# Patient Record
Sex: FEMALE | Race: WHITE | Employment: OTHER | URBAN - METROPOLITAN AREA
[De-identification: names, ages, dates, MRNs, and addresses within clinical notes are randomized per-mention and may not be internally consistent; named-entity substitution may affect disease eponyms.]

---

## 2021-07-08 ENCOUNTER — APPOINTMENT (OUTPATIENT)
Dept: CT IMAGING | Age: 86
End: 2021-07-08
Payer: MEDICARE

## 2021-07-08 ENCOUNTER — HOSPITAL ENCOUNTER (EMERGENCY)
Age: 86
Discharge: HOME | End: 2021-07-09
Payer: MEDICARE

## 2021-07-08 DIAGNOSIS — S01.01XA LACERATION OF SCALP, INITIAL ENCOUNTER: ICD-10-CM

## 2021-07-08 DIAGNOSIS — S09.90XA CLOSED HEAD INJURY, INITIAL ENCOUNTER: Primary | ICD-10-CM

## 2021-07-08 DIAGNOSIS — S13.9XXA NECK SPRAIN, INITIAL ENCOUNTER: ICD-10-CM

## 2021-07-08 PROCEDURE — 70450 CT HEAD/BRAIN W/O DYE: CPT

## 2021-07-08 PROCEDURE — 72125 CT NECK SPINE W/O DYE: CPT

## 2021-07-08 PROCEDURE — 12001 RPR S/N/AX/GEN/TRNK 2.5CM/<: CPT

## 2021-07-08 PROCEDURE — 99284 EMERGENCY DEPT VISIT MOD MDM: CPT

## 2021-07-08 ASSESSMENT — PAIN SCALES - GENERAL: PAINLEVEL_OUTOF10: 1

## 2021-07-08 ASSESSMENT — PAIN DESCRIPTION - ONSET: ONSET: ON-GOING

## 2021-07-08 ASSESSMENT — PAIN DESCRIPTION - FREQUENCY: FREQUENCY: CONTINUOUS

## 2021-07-08 ASSESSMENT — PAIN DESCRIPTION - ORIENTATION: ORIENTATION: POSTERIOR

## 2021-07-08 ASSESSMENT — PAIN DESCRIPTION - PROGRESSION: CLINICAL_PROGRESSION: GRADUALLY WORSENING

## 2021-07-08 ASSESSMENT — PAIN DESCRIPTION - DESCRIPTORS: DESCRIPTORS: DISCOMFORT

## 2021-07-08 ASSESSMENT — PAIN DESCRIPTION - PAIN TYPE: TYPE: ACUTE PAIN

## 2021-07-08 ASSESSMENT — PAIN DESCRIPTION - LOCATION: LOCATION: HEAD

## 2021-07-09 VITALS
OXYGEN SATURATION: 97 % | SYSTOLIC BLOOD PRESSURE: 200 MMHG | WEIGHT: 125 LBS | HEART RATE: 75 BPM | BODY MASS INDEX: 20.83 KG/M2 | DIASTOLIC BLOOD PRESSURE: 94 MMHG | HEIGHT: 65 IN | TEMPERATURE: 97.5 F | RESPIRATION RATE: 14 BRPM

## 2021-07-09 RX ORDER — BACITRACIN ZINC AND POLYMYXIN B SULFATE 500; 1000 [USP'U]/G; [USP'U]/G
OINTMENT TOPICAL
Qty: 30 G | Refills: 0 | Status: SHIPPED | OUTPATIENT
Start: 2021-07-09 | End: 2021-07-16

## 2021-07-09 NOTE — ED NOTES
FIRST PROVIDER CONTACT ASSESSMENT NOTE        Department of Emergency Medicine            ED  First Provider Note            7/8/21  8:40 PM EDT    Chief Complaint: Fall (fell backwards, hitting head on step) and Head Injury (laceration back of head, no blood thinners)      History of Present Illness:    Maximilian Torres is a 80 y.o. female who presents to the emergency department for  Mechanical fall  Head injury scalp laceration   Focused Screening Exam:  Constitutional:  Alert, appears stated age and is in no distress. Heart rrr   Lungs clear    *ALLERGIES*     Patient has no known allergies.      ED Triage Vitals [07/08/21 2038]   BP Temp Temp Source Pulse Resp SpO2 Height Weight   (!) 225/101 97 °F (36.1 °C) Temporal 88 14 98 % 5' 5\" (1.651 m) 125 lb (56.7 kg)        Initial Plan of Care:  Initiate Treatment-Testing, Proceed toTreatment Area When Bed Available for ED Attending/MLP to Continue Care    -----------------END OF FIRST PROVIDER CONTACT ASSESSMENT NOTE--------------  Electronically signed by LANI Cuellar   DD: 7/8/21     LANI Cuellar  07/08/21 2041

## 2021-07-09 NOTE — ED PROVIDER NOTES
Independent MLP   HPI:  7/9/21, Time: 12:05 AM EDT         Maximilian Torres is a 80 y.o. female presenting to the ED for fall , beginning prior to arrival .  The complaint has been persistent, moderate in severity, and worsened by nothing. Patient comes in for mechanical fall. Her foot got caught on a step causing her to shoe to come off and she fell backwards hitting her head. She denied any loss of consciousness. She denies any neck pain. Denies any chest pain abdominal pain no hip pain or lower extremity pain. She denies being on any blood thinners. Patient denies any headache. No blurred vision loss of vision    Review of Systems:   A complete review of systems was performed and pertinent positives and negatives are stated within HPI, all other systems reviewed and are negative.          --------------------------------------------- PAST HISTORY ---------------------------------------------  Past Medical History:  has a past medical history of Hypertension. Past Surgical History:  has no past surgical history on file. Social History:  reports that she has never smoked. She has never used smokeless tobacco. She reports current alcohol use. She reports that she does not use drugs. Family History: family history is not on file. The patients home medications have been reviewed. Allergies: Patient has no known allergies. -------------------------------------------------- RESULTS -------------------------------------------------  All laboratory and radiology results have been personally reviewed by myself   LABS:  No results found for this visit on 07/08/21. RADIOLOGY:  Interpreted by Radiologist.  Lelia Oh   Final Result   No acute intracranial abnormality. Age-related loss of brain volume and   chronic periventricular ischemic changes. Small chronic basal ganglia   lacunar infarcts. Right posterior parietal scalp hematoma.       Air-fluid level in the left sphenoid sinus which may be secondary to acute   sinusitis or may reflect dependent secretions. CT CERVICAL SPINE WO CONTRAST   Final Result   No evidence of acute cervical spine fracture. Degenerative changes of the cervical spine as noted. ------------------------- NURSING NOTES AND VITALS REVIEWED ---------------------------   The nursing notes within the ED encounter and vital signs as below have been reviewed. BP (!) 200/94   Pulse 75   Temp 97.5 °F (36.4 °C) (Oral)   Resp 14   Ht 5' 5\" (1.651 m)   Wt 125 lb (56.7 kg)   SpO2 97%   BMI 20.80 kg/m²   Oxygen Saturation Interpretation: Normal      ---------------------------------------------------PHYSICAL EXAM--------------------------------------      Constitutional/General: Alert and oriented x3, well appearing, non toxic in NAD  Head: Normocephalic 2 cm laceration of the right posterior scalp  Eyes: PERRL, EOMI  Mouth: Oropharynx clear, handling secretions, no trismus  Neck: Supple, full ROM, no vertebral point tenderness  Pulmonary: Lungs clear to auscultation bilaterally, no wheezes, rales, or rhonchi. Not in respiratory distress  Cardiovascular:  Regular rate and rhythm, no murmurs, gallops, or rubs. 2+ distal pulses  Abdomen: Soft, non tender, non distended,   Back no vertebral point tenderness  Extremities: Moves all extremities x 4. Warm and well perfused no bony point tenderness of the hip or pelvis no tenderness of the upper or lower extremities no chest wall tenderness  Skin: warm and dry without rash  Neurologic: GCS 15,  Psych: Normal Affect      ------------------------------ ED COURSE/MEDICAL DECISION MAKING----------------------  Medications   Tetanus-Diphth-Acell Pertussis (BOOSTRIX) injection 0.5 mL (0.5 mLs Intramuscular Not Given 7/9/21 0039)         ED COURSE:   Patient states she has chronic sinus congestions had no recent change or fevers to the sinus congestion.     I reverified  Patient is not on eliquis .    Patient with noted elevated blood pressure she states she was due for her blood pressure meds and took medications here in the ER she did not want to wait for any further evaluation after taking the medication      PROCEDURE NOTE  7/9/21       Time:2340    LACERATION REPAIR  Risks, benefits and alternatives (for applicable procedures below) described. Performed By: LANI Pedraza. Informed consent: Verbal consent obtained. The patient was counseled regarding the procedure in person, it's indications, risks, potential complications and alternatives and any questions were answered. Verbal consent was obtained. Laceration #: 1. Location: right posterior scalp  Length: 2 cm. The wound area was cleansend with shur-clens and draped in a sterile fashion. Local Anesthesia:  obtained with Lidocaine 1% without epinephrine. The wound was explored with the following results: Thickness: partial thickness. No foreign bodies found. Debridement: None. Undermining: partial thickness. Wound Margins Revised: yes. Flaps Aligned: no. The wound was closed #4 staple(s). There were no additional wounds requiring formal closure. 4 staples   Medical Decision Making:    She came in for mechanical fall hitting the back of her head with laceration to the posterior scalp. CT head no intracranial bleed there is finding of air-fluid level in the left patient denies any acute symptoms states she has chronic sinus congestion. No fevers. Did not want treated for any sinusitis. CT cervical no fracture or dislocation. She had no pain of the hips or pelvis area she is able to ambulate. Patient was noted to be hypertensive she has history of hypertension took her blood pressure medication prior to being discharged.   Discussed with daughter monitoring patient closely due to the head injury for any change in mental status persistent vomiting she understands she is to return to the ER if any worsening symptoms    Counseling: The emergency provider has spoken with the patient and discussed todays results, in addition to providing specific details for the plan of care and counseling regarding the diagnosis and prognosis. Questions are answered at this time and they are agreeable with the plan.      --------------------------------- IMPRESSION AND DISPOSITION ---------------------------------    IMPRESSION  1. Closed head injury, initial encounter    2. Neck sprain, initial encounter    3. Laceration of scalp, initial encounter        DISPOSITION  Disposition: Discharge to home  Patient condition is good      NOTE: This report was transcribed using voice recognition software.  Every effort was made to ensure accuracy; however, inadvertent computerized transcription errors may be present     Ami Kilgore  07/09/21 0201

## 2022-09-21 ENCOUNTER — HOSPITAL ENCOUNTER (OUTPATIENT)
Age: 87
Discharge: HOME | End: 2022-09-23
Payer: MEDICARE

## 2022-09-21 ENCOUNTER — HOSPITAL ENCOUNTER (OUTPATIENT)
Dept: GENERAL RADIOLOGY | Age: 87
Discharge: HOME | End: 2022-09-23
Payer: MEDICARE

## 2022-09-21 DIAGNOSIS — M54.50 LOW BACK PAIN, UNSPECIFIED BACK PAIN LATERALITY, UNSPECIFIED CHRONICITY, UNSPECIFIED WHETHER SCIATICA PRESENT: ICD-10-CM

## 2022-09-21 DIAGNOSIS — M79.605 LEFT LEG PAIN: ICD-10-CM

## 2022-09-21 PROCEDURE — 72170 X-RAY EXAM OF PELVIS: CPT

## 2022-09-21 PROCEDURE — 72110 X-RAY EXAM L-2 SPINE 4/>VWS: CPT

## 2023-07-07 ENCOUNTER — APPOINTMENT (OUTPATIENT)
Dept: CT IMAGING | Age: 88
End: 2023-07-07
Payer: COMMERCIAL

## 2023-07-07 ENCOUNTER — APPOINTMENT (OUTPATIENT)
Dept: GENERAL RADIOLOGY | Age: 88
End: 2023-07-07
Payer: COMMERCIAL

## 2023-07-07 ENCOUNTER — HOSPITAL ENCOUNTER (EMERGENCY)
Age: 88
Discharge: ANOTHER HOSPITAL | End: 2023-07-07
Attending: EMERGENCY MEDICINE
Payer: COMMERCIAL

## 2023-07-07 ENCOUNTER — HOSPITAL ENCOUNTER (INPATIENT)
Age: 88
LOS: 3 days | Discharge: HOME | DRG: 065 | End: 2023-07-10
Attending: EMERGENCY MEDICINE | Admitting: INTERNAL MEDICINE
Payer: COMMERCIAL

## 2023-07-07 VITALS
HEART RATE: 101 BPM | HEIGHT: 66 IN | DIASTOLIC BLOOD PRESSURE: 63 MMHG | OXYGEN SATURATION: 95 % | BODY MASS INDEX: 20.89 KG/M2 | SYSTOLIC BLOOD PRESSURE: 123 MMHG | TEMPERATURE: 98.8 F | RESPIRATION RATE: 19 BRPM | WEIGHT: 130 LBS

## 2023-07-07 DIAGNOSIS — I48.91 ATRIAL FIBRILLATION, UNSPECIFIED TYPE (HCC): ICD-10-CM

## 2023-07-07 DIAGNOSIS — I63.312 CEREBROVASCULAR ACCIDENT (CVA) DUE TO THROMBOSIS OF LEFT MIDDLE CEREBRAL ARTERY (HCC): Primary | ICD-10-CM

## 2023-07-07 DIAGNOSIS — I63.9 ACUTE CVA (CEREBROVASCULAR ACCIDENT) (HCC): Primary | ICD-10-CM

## 2023-07-07 LAB
ALBUMIN SERPL-MCNC: 4.4 G/DL (ref 3.5–5.2)
ALP SERPL-CCNC: 85 U/L (ref 35–104)
ALT SERPL-CCNC: 16 U/L (ref 0–32)
ANION GAP SERPL CALCULATED.3IONS-SCNC: 13 MMOL/L (ref 7–16)
APTT BLD: 28.2 SEC (ref 24.5–35.1)
AST SERPL-CCNC: 21 U/L (ref 0–31)
BASOPHILS # BLD: 0.04 E9/L (ref 0–0.2)
BASOPHILS NFR BLD: 0.5 % (ref 0–2)
BILIRUB SERPL-MCNC: 0.7 MG/DL (ref 0–1.2)
BUN SERPL-MCNC: 14 MG/DL (ref 6–23)
CALCIUM SERPL-MCNC: 9.5 MG/DL (ref 8.6–10.2)
CHLORIDE SERPL-SCNC: 99 MMOL/L (ref 98–107)
CO2 SERPL-SCNC: 22 MMOL/L (ref 22–29)
CREAT SERPL-MCNC: 0.7 MG/DL (ref 0.5–1)
EOSINOPHIL # BLD: 0.07 E9/L (ref 0.05–0.5)
EOSINOPHIL NFR BLD: 0.9 % (ref 0–6)
ERYTHROCYTE [DISTWIDTH] IN BLOOD BY AUTOMATED COUNT: 11.9 FL (ref 11.5–15)
GLUCOSE SERPL-MCNC: 126 MG/DL (ref 74–99)
HCT VFR BLD AUTO: 36.5 % (ref 34–48)
HGB BLD-MCNC: 12.3 G/DL (ref 11.5–15.5)
IMM GRANULOCYTES # BLD: 0.01 E9/L
IMM GRANULOCYTES NFR BLD: 0.1 % (ref 0–5)
INR BLD: 1.1
LYMPHOCYTES # BLD: 1.74 E9/L (ref 1.5–4)
LYMPHOCYTES NFR BLD: 21.5 % (ref 20–42)
MCH RBC QN AUTO: 32 PG (ref 26–35)
MCHC RBC AUTO-ENTMCNC: 33.7 % (ref 32–34.5)
MCV RBC AUTO: 95.1 FL (ref 80–99.9)
METER GLUCOSE: 123 MG/DL (ref 74–99)
MONOCYTES # BLD: 0.81 E9/L (ref 0.1–0.95)
MONOCYTES NFR BLD: 10 % (ref 2–12)
NEUTROPHILS # BLD: 5.43 E9/L (ref 1.8–7.3)
NEUTS SEG NFR BLD: 67 % (ref 43–80)
PLATELET # BLD AUTO: 302 E9/L (ref 130–450)
PMV BLD AUTO: 10.7 FL (ref 7–12)
POTASSIUM SERPL-SCNC: 3.7 MMOL/L (ref 3.5–5)
PROT SERPL-MCNC: 7.2 G/DL (ref 6.4–8.3)
PROTHROMBIN TIME: 12.9 SEC (ref 9.3–12.4)
RBC # BLD AUTO: 3.84 E12/L (ref 3.5–5.5)
SODIUM SERPL-SCNC: 134 MMOL/L (ref 132–146)
TROPONIN, HIGH SENSITIVITY: 11 NG/L (ref 0–9)
WBC # BLD: 8.1 E9/L (ref 4.5–11.5)

## 2023-07-07 PROCEDURE — 0042T CT BRAIN PERFUSION: CPT

## 2023-07-07 PROCEDURE — 71045 X-RAY EXAM CHEST 1 VIEW: CPT

## 2023-07-07 PROCEDURE — 85025 COMPLETE CBC W/AUTO DIFF WBC: CPT

## 2023-07-07 PROCEDURE — 36415 COLL VENOUS BLD VENIPUNCTURE: CPT

## 2023-07-07 PROCEDURE — 82962 GLUCOSE BLOOD TEST: CPT

## 2023-07-07 PROCEDURE — 93005 ELECTROCARDIOGRAM TRACING: CPT

## 2023-07-07 PROCEDURE — 85610 PROTHROMBIN TIME: CPT

## 2023-07-07 PROCEDURE — 6360000002 HC RX W HCPCS

## 2023-07-07 PROCEDURE — 84484 ASSAY OF TROPONIN QUANT: CPT

## 2023-07-07 PROCEDURE — 6360000004 HC RX CONTRAST MEDICATION: Performed by: RADIOLOGY

## 2023-07-07 PROCEDURE — 2000000000 HC ICU R&B

## 2023-07-07 PROCEDURE — 70498 CT ANGIOGRAPHY NECK: CPT

## 2023-07-07 PROCEDURE — 80053 COMPREHEN METABOLIC PANEL: CPT

## 2023-07-07 PROCEDURE — 70496 CT ANGIOGRAPHY HEAD: CPT

## 2023-07-07 PROCEDURE — 99285 EMERGENCY DEPT VISIT HI MDM: CPT

## 2023-07-07 PROCEDURE — 85730 THROMBOPLASTIN TIME PARTIAL: CPT

## 2023-07-07 PROCEDURE — 96374 THER/PROPH/DIAG INJ IV PUSH: CPT

## 2023-07-07 PROCEDURE — 2580000003 HC RX 258

## 2023-07-07 RX ORDER — DEXTROSE MONOHYDRATE 25 G/50ML
12.5 INJECTION, SOLUTION INTRAVENOUS
Status: DISCONTINUED | OUTPATIENT
Start: 2023-07-07 | End: 2023-07-07 | Stop reason: HOSPADM

## 2023-07-07 RX ORDER — SODIUM CHLORIDE 0.9 % (FLUSH) 0.9 %
5-40 SYRINGE (ML) INJECTION EVERY 12 HOURS SCHEDULED
Status: DISCONTINUED | OUTPATIENT
Start: 2023-07-07 | End: 2023-07-10 | Stop reason: HOSPADM

## 2023-07-07 RX ORDER — SODIUM CHLORIDE 0.9 % (FLUSH) 0.9 %
10 SYRINGE (ML) INJECTION ONCE
Status: COMPLETED | OUTPATIENT
Start: 2023-07-07 | End: 2023-07-07

## 2023-07-07 RX ORDER — DEXTROSE MONOHYDRATE 25 G/50ML
12.5 INJECTION, SOLUTION INTRAVENOUS
Status: ACTIVE | OUTPATIENT
Start: 2023-07-07 | End: 2023-07-08

## 2023-07-07 RX ORDER — SODIUM CHLORIDE 0.9 % (FLUSH) 0.9 %
5-40 SYRINGE (ML) INJECTION PRN
Status: DISCONTINUED | OUTPATIENT
Start: 2023-07-07 | End: 2023-07-07 | Stop reason: HOSPADM

## 2023-07-07 RX ORDER — SODIUM CHLORIDE 9 MG/ML
INJECTION, SOLUTION INTRAVENOUS PRN
Status: DISCONTINUED | OUTPATIENT
Start: 2023-07-07 | End: 2023-07-07 | Stop reason: HOSPADM

## 2023-07-07 RX ORDER — SODIUM CHLORIDE 0.9 % (FLUSH) 0.9 %
5-40 SYRINGE (ML) INJECTION EVERY 12 HOURS SCHEDULED
Status: DISCONTINUED | OUTPATIENT
Start: 2023-07-07 | End: 2023-07-07 | Stop reason: HOSPADM

## 2023-07-07 RX ORDER — SODIUM CHLORIDE 9 MG/ML
INJECTION, SOLUTION INTRAVENOUS PRN
Status: DISCONTINUED | OUTPATIENT
Start: 2023-07-07 | End: 2023-07-10 | Stop reason: HOSPADM

## 2023-07-07 RX ORDER — SODIUM CHLORIDE 0.9 % (FLUSH) 0.9 %
5-40 SYRINGE (ML) INJECTION PRN
Status: DISCONTINUED | OUTPATIENT
Start: 2023-07-07 | End: 2023-07-10 | Stop reason: HOSPADM

## 2023-07-07 RX ORDER — LABETALOL HYDROCHLORIDE 5 MG/ML
10 INJECTION, SOLUTION INTRAVENOUS EVERY 10 MIN PRN
Status: DISCONTINUED | OUTPATIENT
Start: 2023-07-07 | End: 2023-07-10 | Stop reason: HOSPADM

## 2023-07-07 RX ADMIN — IOPAMIDOL 100 ML: 755 INJECTION, SOLUTION INTRAVENOUS at 18:56

## 2023-07-07 RX ADMIN — Medication 10 ML: at 19:51

## 2023-07-07 RX ADMIN — Medication 15 MG: at 19:48

## 2023-07-07 RX ADMIN — Medication 10 ML: at 19:48

## 2023-07-07 ASSESSMENT — LIFESTYLE VARIABLES
HOW MANY STANDARD DRINKS CONTAINING ALCOHOL DO YOU HAVE ON A TYPICAL DAY: PATIENT DOES NOT DRINK
HOW OFTEN DO YOU HAVE A DRINK CONTAINING ALCOHOL: NEVER
HOW OFTEN DO YOU HAVE A DRINK CONTAINING ALCOHOL: MONTHLY OR LESS
HOW MANY STANDARD DRINKS CONTAINING ALCOHOL DO YOU HAVE ON A TYPICAL DAY: 1 OR 2

## 2023-07-07 ASSESSMENT — PAIN SCALES - GENERAL
PAINLEVEL_OUTOF10: 0
PAINLEVEL_OUTOF10: 0

## 2023-07-07 ASSESSMENT — PAIN - FUNCTIONAL ASSESSMENT
PAIN_FUNCTIONAL_ASSESSMENT: 0-10
PAIN_FUNCTIONAL_ASSESSMENT: NONE - DENIES PAIN
PAIN_FUNCTIONAL_ASSESSMENT: NONE - DENIES PAIN

## 2023-07-07 NOTE — VIRTUAL HEALTH
Consults    Soha Barr, was evaluated through a synchronous (real-time) audio-video encounter. The patient (and/or guardian if applicable) is aware that this is a billable service, which includes applicable co-pays. This virtual visit was conducted with patient's (and/or legal guardian's) consent. Patient identification was verified, and a caregiver was present when appropriate. The patient was located at [address]): [address] [facility] EMERGENCY DEPARTMENT  [address] [facility] [address]  Loc: [phone]  The provider was located at [facility] PSYCHIATRIC REHABILITATION CT Dept): [facility]  [address]  [place] [address]  [phone]     Total time spent on this encounter:  40    --Robin Wright MD on 7/7/2023 at 6:55 PM    An electronic signature was used to authenticate this note. Count includes the [facility] Stroke and Telestroke Consult for  [facility] Stroke Alert through [facility] B @ 6:50pm  7/7/2023 6:55 PM    Pt Name: Soha Barr  MRN: [id number]  [facility] 7/4/1931  Date of evaluation: 7/7/2023  Primary Care Physician: Marlen Guaman DO  Reason for Evaluation: Stroke Evaluation with Discussion with Ed or primary team with Telemedicine and stroke evaluation with Review of imaging and labs    Soha Barr is a 80 y.o. female with afib not on any anticoagulant, confirmed by daughters at bedside, p/w difficult expressing herself since 4:30pm and right arm weakness noted by ED. Right arm weakness improved, patient still remained dysarthric and mild aphasic. Pt is frustrated she couldn't express well. Per family, patient is a 81 yo and live independently. Pt is in Afib RVR in the ED. LKW: 12pm- 4:30pm  NIH:  2    Allergies  has No Known Allergies. Medications  Prior to Admission medications    Medication Sig Start Date End Date Taking?  Authorizing Provider   apixaban (ELIQUIS) 2.5 MG TABS tablet Take

## 2023-07-07 NOTE — ED NOTES
Radiology Procedure Waiver   Name: Pushpa Huang  : 1931  MRN: [id number]    Date:  23    Time: 6:44 PM EDT    Benefits of immediately proceeding with radiology exam(s) without pre-testing outweigh the risks or are not indicated as specified below and therefore the following is/are being waived:    [x] Benefits of immediate radiology exam(s) outweigh any risk. OR    Pre-exam testing is not indicated for the following reason(s):  [] Pregnancy test   [] Patients LMP on-time and regular.   [] Patient had Tubal Ligation or has other Contraception Device. [] Patient  is Menopausal or Premenarcheal.    [] Patient had Full or Partial Hysterectomy. [] Protocol for CT contrast allegry   [] Patient has tolerated well previously   [] Patient does not have a true allergy    [] MRI Questionnaire     [] BUN/Creatinine   [] Patient age w/no hx of renal dysfunction. [] Patient on Dialysis. [] Recent Normal Labs.   Electronically signed by Bayron Ayala DO on 23 at 6:44 PM EDT              Bayron Ayala DO  Resident  23 [id number]

## 2023-07-08 ENCOUNTER — APPOINTMENT (OUTPATIENT)
Dept: CT IMAGING | Age: 88
DRG: 065 | End: 2023-07-08
Payer: COMMERCIAL

## 2023-07-08 LAB
ANION GAP SERPL CALCULATED.3IONS-SCNC: 14 MMOL/L (ref 7–16)
BASOPHILS # BLD: 0.04 E9/L (ref 0–0.2)
BASOPHILS NFR BLD: 0.6 % (ref 0–2)
BUN SERPL-MCNC: 9 MG/DL (ref 6–23)
CALCIUM SERPL-MCNC: 9 MG/DL (ref 8.6–10.2)
CHLORIDE SERPL-SCNC: 103 MMOL/L (ref 98–107)
CHOLESTEROL, TOTAL: 185 MG/DL (ref 0–199)
CO2 SERPL-SCNC: 19 MMOL/L (ref 22–29)
CREAT SERPL-MCNC: 0.5 MG/DL (ref 0.5–1)
EKG ATRIAL RATE: 94 BPM
EKG Q-T INTERVAL: 322 MS
EKG QRS DURATION: 78 MS
EKG QTC CALCULATION (BAZETT): 451 MS
EKG R AXIS: -46 DEGREES
EKG T AXIS: 73 DEGREES
EKG VENTRICULAR RATE: 118 BPM
EOSINOPHIL # BLD: 0.05 E9/L (ref 0.05–0.5)
EOSINOPHIL NFR BLD: 0.7 % (ref 0–6)
ERYTHROCYTE [DISTWIDTH] IN BLOOD BY AUTOMATED COUNT: 11.8 FL (ref 11.5–15)
GLUCOSE SERPL-MCNC: 125 MG/DL (ref 74–99)
HBA1C MFR BLD: 5.7 % (ref 4–5.6)
HCT VFR BLD AUTO: 38.9 % (ref 34–48)
HDLC SERPL-MCNC: 77 MG/DL
HGB BLD-MCNC: 12.6 G/DL (ref 11.5–15.5)
IMM GRANULOCYTES # BLD: 0.03 E9/L
IMM GRANULOCYTES NFR BLD: 0.4 % (ref 0–5)
LDLC SERPL CALC-MCNC: 95 MG/DL (ref 0–99)
LYMPHOCYTES # BLD: 1.2 E9/L (ref 1.5–4)
LYMPHOCYTES NFR BLD: 17.4 % (ref 20–42)
MCH RBC QN AUTO: 31.3 PG (ref 26–35)
MCHC RBC AUTO-ENTMCNC: 32.4 % (ref 32–34.5)
MCV RBC AUTO: 96.5 FL (ref 80–99.9)
MONOCYTES # BLD: 0.78 E9/L (ref 0.1–0.95)
MONOCYTES NFR BLD: 11.3 % (ref 2–12)
NEUTROPHILS # BLD: 4.8 E9/L (ref 1.8–7.3)
NEUTS SEG NFR BLD: 69.6 % (ref 43–80)
PLATELET # BLD AUTO: 279 E9/L (ref 130–450)
PMV BLD AUTO: 10.3 FL (ref 7–12)
POTASSIUM SERPL-SCNC: 4.3 MMOL/L (ref 3.5–5)
RBC # BLD AUTO: 4.03 E12/L (ref 3.5–5.5)
REASON FOR REJECTION: NORMAL
REJECTED TEST: NORMAL
SODIUM SERPL-SCNC: 136 MMOL/L (ref 132–146)
TRIGL SERPL-MCNC: 63 MG/DL (ref 0–149)
TROPONIN, HIGH SENSITIVITY: 12 NG/L (ref 0–9)
VLDLC SERPL CALC-MCNC: 13 MG/DL
WBC # BLD: 6.9 E9/L (ref 4.5–11.5)

## 2023-07-08 PROCEDURE — 70450 CT HEAD/BRAIN W/O DYE: CPT

## 2023-07-08 PROCEDURE — APPSS30 APP SPLIT SHARED TIME 16-30 MINUTES: Performed by: CLINICAL NURSE SPECIALIST

## 2023-07-08 PROCEDURE — 2580000003 HC RX 258: Performed by: NURSE PRACTITIONER

## 2023-07-08 PROCEDURE — 80048 BASIC METABOLIC PNL TOTAL CA: CPT

## 2023-07-08 PROCEDURE — 84484 ASSAY OF TROPONIN QUANT: CPT

## 2023-07-08 PROCEDURE — 92610 EVALUATE SWALLOWING FUNCTION: CPT

## 2023-07-08 PROCEDURE — 6370000000 HC RX 637 (ALT 250 FOR IP)

## 2023-07-08 PROCEDURE — 2580000003 HC RX 258

## 2023-07-08 PROCEDURE — 6370000000 HC RX 637 (ALT 250 FOR IP): Performed by: CLINICAL NURSE SPECIALIST

## 2023-07-08 PROCEDURE — 36415 COLL VENOUS BLD VENIPUNCTURE: CPT

## 2023-07-08 PROCEDURE — 2000000000 HC ICU R&B

## 2023-07-08 PROCEDURE — 92523 SPEECH SOUND LANG COMPREHEN: CPT

## 2023-07-08 PROCEDURE — 2580000003 HC RX 258: Performed by: EMERGENCY MEDICINE

## 2023-07-08 PROCEDURE — 83036 HEMOGLOBIN GLYCOSYLATED A1C: CPT

## 2023-07-08 PROCEDURE — 85025 COMPLETE CBC W/AUTO DIFF WBC: CPT

## 2023-07-08 PROCEDURE — 93010 ELECTROCARDIOGRAM REPORT: CPT | Performed by: INTERNAL MEDICINE

## 2023-07-08 PROCEDURE — 80061 LIPID PANEL: CPT

## 2023-07-08 RX ORDER — ACETAMINOPHEN 650 MG/1
650 SUPPOSITORY RECTAL EVERY 6 HOURS PRN
Status: DISCONTINUED | OUTPATIENT
Start: 2023-07-08 | End: 2023-07-10 | Stop reason: HOSPADM

## 2023-07-08 RX ORDER — ATORVASTATIN CALCIUM 40 MG/1
40 TABLET, FILM COATED ORAL NIGHTLY
Status: DISCONTINUED | OUTPATIENT
Start: 2023-07-08 | End: 2023-07-10 | Stop reason: HOSPADM

## 2023-07-08 RX ORDER — SODIUM CHLORIDE 0.9 % (FLUSH) 0.9 %
5-40 SYRINGE (ML) INJECTION PRN
Status: DISCONTINUED | OUTPATIENT
Start: 2023-07-08 | End: 2023-07-10 | Stop reason: HOSPADM

## 2023-07-08 RX ORDER — SODIUM CHLORIDE 9 MG/ML
INJECTION, SOLUTION INTRAVENOUS PRN
Status: DISCONTINUED | OUTPATIENT
Start: 2023-07-08 | End: 2023-07-10 | Stop reason: HOSPADM

## 2023-07-08 RX ORDER — ACETAMINOPHEN 325 MG/1
650 TABLET ORAL EVERY 6 HOURS PRN
Status: DISCONTINUED | OUTPATIENT
Start: 2023-07-08 | End: 2023-07-10 | Stop reason: HOSPADM

## 2023-07-08 RX ORDER — SODIUM CHLORIDE 0.9 % (FLUSH) 0.9 %
5-40 SYRINGE (ML) INJECTION EVERY 12 HOURS SCHEDULED
Status: DISCONTINUED | OUTPATIENT
Start: 2023-07-08 | End: 2023-07-10 | Stop reason: HOSPADM

## 2023-07-08 RX ORDER — POLYETHYLENE GLYCOL 3350 17 G/17G
17 POWDER, FOR SOLUTION ORAL DAILY
Status: DISCONTINUED | OUTPATIENT
Start: 2023-07-08 | End: 2023-07-10 | Stop reason: HOSPADM

## 2023-07-08 RX ORDER — NIFEDIPINE 60 MG/1
60 TABLET, EXTENDED RELEASE ORAL DAILY
Status: DISCONTINUED | OUTPATIENT
Start: 2023-07-08 | End: 2023-07-10 | Stop reason: HOSPADM

## 2023-07-08 RX ORDER — SODIUM CHLORIDE 9 MG/ML
INJECTION, SOLUTION INTRAVENOUS CONTINUOUS
Status: DISCONTINUED | OUTPATIENT
Start: 2023-07-08 | End: 2023-07-08

## 2023-07-08 RX ORDER — BISACODYL 10 MG
10 SUPPOSITORY, RECTAL RECTAL DAILY PRN
Status: DISCONTINUED | OUTPATIENT
Start: 2023-07-08 | End: 2023-07-10 | Stop reason: HOSPADM

## 2023-07-08 RX ORDER — NIFEDIPINE 60 MG/1
60 TABLET, FILM COATED, EXTENDED RELEASE ORAL DAILY
COMMUNITY
Start: 2023-04-28

## 2023-07-08 RX ORDER — HYDRALAZINE HYDROCHLORIDE 20 MG/ML
10 INJECTION INTRAMUSCULAR; INTRAVENOUS EVERY 10 MIN PRN
Status: DISCONTINUED | OUTPATIENT
Start: 2023-07-08 | End: 2023-07-10 | Stop reason: HOSPADM

## 2023-07-08 RX ADMIN — SODIUM CHLORIDE, PRESERVATIVE FREE 10 ML: 5 INJECTION INTRAVENOUS at 21:41

## 2023-07-08 RX ADMIN — POLYETHYLENE GLYCOL 3350 17 G: 17 POWDER, FOR SOLUTION ORAL at 13:39

## 2023-07-08 RX ADMIN — NIFEDIPINE 60 MG: 60 TABLET, EXTENDED RELEASE ORAL at 13:33

## 2023-07-08 RX ADMIN — SODIUM CHLORIDE: 9 INJECTION, SOLUTION INTRAVENOUS at 02:13

## 2023-07-08 RX ADMIN — ATORVASTATIN CALCIUM 40 MG: 40 TABLET, FILM COATED ORAL at 21:41

## 2023-07-08 NOTE — ED NOTES
PAS arrived for transfer to [facility] ED, care transferred to transport team     Cj Tripathi RN  07/07/23 2950

## 2023-07-09 ENCOUNTER — APPOINTMENT (OUTPATIENT)
Dept: MRI IMAGING | Age: 88
DRG: 065 | End: 2023-07-09
Payer: COMMERCIAL

## 2023-07-09 LAB
ANION GAP SERPL CALCULATED.3IONS-SCNC: 11 MMOL/L (ref 7–16)
BUN SERPL-MCNC: 17 MG/DL (ref 6–23)
CALCIUM SERPL-MCNC: 9.3 MG/DL (ref 8.6–10.2)
CHLORIDE SERPL-SCNC: 103 MMOL/L (ref 98–107)
CO2 SERPL-SCNC: 22 MMOL/L (ref 22–29)
CREAT SERPL-MCNC: 0.7 MG/DL (ref 0.5–1)
ERYTHROCYTE [DISTWIDTH] IN BLOOD BY AUTOMATED COUNT: 11.9 FL (ref 11.5–15)
GLUCOSE SERPL-MCNC: 95 MG/DL (ref 74–99)
HCT VFR BLD AUTO: 37.7 % (ref 34–48)
HGB BLD-MCNC: 12.4 G/DL (ref 11.5–15.5)
MAGNESIUM SERPL-MCNC: 2.1 MG/DL (ref 1.6–2.6)
MCH RBC QN AUTO: 31.8 PG (ref 26–35)
MCHC RBC AUTO-ENTMCNC: 32.9 % (ref 32–34.5)
MCV RBC AUTO: 96.7 FL (ref 80–99.9)
PHOSPHATE SERPL-MCNC: 3.8 MG/DL (ref 2.5–4.5)
PLATELET # BLD AUTO: 285 E9/L (ref 130–450)
PMV BLD AUTO: 10.3 FL (ref 7–12)
POTASSIUM SERPL-SCNC: 4.4 MMOL/L (ref 3.5–5)
RBC # BLD AUTO: 3.9 E12/L (ref 3.5–5.5)
SODIUM SERPL-SCNC: 136 MMOL/L (ref 132–146)
WBC # BLD: 5.8 E9/L (ref 4.5–11.5)

## 2023-07-09 PROCEDURE — 70551 MRI BRAIN STEM W/O DYE: CPT

## 2023-07-09 PROCEDURE — 99232 SBSQ HOSP IP/OBS MODERATE 35: CPT | Performed by: PHYSICIAN ASSISTANT

## 2023-07-09 PROCEDURE — 6370000000 HC RX 637 (ALT 250 FOR IP): Performed by: PHYSICIAN ASSISTANT

## 2023-07-09 PROCEDURE — 97535 SELF CARE MNGMENT TRAINING: CPT | Performed by: OCCUPATIONAL THERAPIST

## 2023-07-09 PROCEDURE — 83735 ASSAY OF MAGNESIUM: CPT

## 2023-07-09 PROCEDURE — 36415 COLL VENOUS BLD VENIPUNCTURE: CPT

## 2023-07-09 PROCEDURE — 85027 COMPLETE CBC AUTOMATED: CPT

## 2023-07-09 PROCEDURE — 2580000003 HC RX 258: Performed by: NURSE PRACTITIONER

## 2023-07-09 PROCEDURE — 84100 ASSAY OF PHOSPHORUS: CPT

## 2023-07-09 PROCEDURE — 2580000003 HC RX 258: Performed by: STUDENT IN AN ORGANIZED HEALTH CARE EDUCATION/TRAINING PROGRAM

## 2023-07-09 PROCEDURE — 97165 OT EVAL LOW COMPLEX 30 MIN: CPT | Performed by: OCCUPATIONAL THERAPIST

## 2023-07-09 PROCEDURE — 6370000000 HC RX 637 (ALT 250 FOR IP): Performed by: STUDENT IN AN ORGANIZED HEALTH CARE EDUCATION/TRAINING PROGRAM

## 2023-07-09 PROCEDURE — 6370000000 HC RX 637 (ALT 250 FOR IP)

## 2023-07-09 PROCEDURE — 80048 BASIC METABOLIC PNL TOTAL CA: CPT

## 2023-07-09 PROCEDURE — 2060000000 HC ICU INTERMEDIATE R&B

## 2023-07-09 RX ORDER — ASPIRIN 81 MG/1
81 TABLET, CHEWABLE ORAL DAILY
Status: DISCONTINUED | OUTPATIENT
Start: 2023-07-09 | End: 2023-07-10 | Stop reason: HOSPADM

## 2023-07-09 RX ADMIN — SODIUM CHLORIDE, PRESERVATIVE FREE 10 ML: 5 INJECTION INTRAVENOUS at 21:22

## 2023-07-09 RX ADMIN — SODIUM CHLORIDE, PRESERVATIVE FREE 10 ML: 5 INJECTION INTRAVENOUS at 10:14

## 2023-07-09 RX ADMIN — NIFEDIPINE 60 MG: 60 TABLET, EXTENDED RELEASE ORAL at 10:07

## 2023-07-09 RX ADMIN — ATORVASTATIN CALCIUM 40 MG: 40 TABLET, FILM COATED ORAL at 21:11

## 2023-07-09 RX ADMIN — ASPIRIN 81 MG 81 MG: 81 TABLET ORAL at 12:10

## 2023-07-09 RX ADMIN — SODIUM CHLORIDE, PRESERVATIVE FREE 5 ML: 5 INJECTION INTRAVENOUS at 21:00

## 2023-07-10 VITALS
OXYGEN SATURATION: 97 % | HEART RATE: 90 BPM | DIASTOLIC BLOOD PRESSURE: 77 MMHG | RESPIRATION RATE: 16 BRPM | WEIGHT: 127.43 LBS | HEIGHT: 66 IN | SYSTOLIC BLOOD PRESSURE: 126 MMHG | TEMPERATURE: 97 F | BODY MASS INDEX: 20.48 KG/M2

## 2023-07-10 LAB
ANION GAP SERPL CALCULATED.3IONS-SCNC: 11 MMOL/L (ref 7–16)
BUN SERPL-MCNC: 12 MG/DL (ref 6–23)
CALCIUM SERPL-MCNC: 8.8 MG/DL (ref 8.6–10.2)
CHLORIDE SERPL-SCNC: 101 MMOL/L (ref 98–107)
CO2 SERPL-SCNC: 20 MMOL/L (ref 22–29)
CREAT SERPL-MCNC: 0.6 MG/DL (ref 0.5–1)
ERYTHROCYTE [DISTWIDTH] IN BLOOD BY AUTOMATED COUNT: 11.6 FL (ref 11.5–15)
GLUCOSE SERPL-MCNC: 136 MG/DL (ref 74–99)
HCT VFR BLD AUTO: 39.7 % (ref 34–48)
HGB BLD-MCNC: 12.6 G/DL (ref 11.5–15.5)
LV EF: 58 %
LVEF MODALITY: NORMAL
MAGNESIUM SERPL-MCNC: 1.9 MG/DL (ref 1.6–2.6)
MCH RBC QN AUTO: 31.3 PG (ref 26–35)
MCHC RBC AUTO-ENTMCNC: 31.7 % (ref 32–34.5)
MCV RBC AUTO: 98.5 FL (ref 80–99.9)
PHOSPHATE SERPL-MCNC: 3.2 MG/DL (ref 2.5–4.5)
PLATELET # BLD AUTO: 281 E9/L (ref 130–450)
PMV BLD AUTO: 10.4 FL (ref 7–12)
POTASSIUM SERPL-SCNC: 4.1 MMOL/L (ref 3.5–5)
RBC # BLD AUTO: 4.03 E12/L (ref 3.5–5.5)
SODIUM SERPL-SCNC: 132 MMOL/L (ref 132–146)
WBC # BLD: 6.4 E9/L (ref 4.5–11.5)

## 2023-07-10 PROCEDURE — 80048 BASIC METABOLIC PNL TOTAL CA: CPT

## 2023-07-10 PROCEDURE — 99232 SBSQ HOSP IP/OBS MODERATE 35: CPT | Performed by: PHYSICIAN ASSISTANT

## 2023-07-10 PROCEDURE — 93306 TTE W/DOPPLER COMPLETE: CPT

## 2023-07-10 PROCEDURE — 36415 COLL VENOUS BLD VENIPUNCTURE: CPT

## 2023-07-10 PROCEDURE — 85027 COMPLETE CBC AUTOMATED: CPT

## 2023-07-10 PROCEDURE — 84100 ASSAY OF PHOSPHORUS: CPT

## 2023-07-10 PROCEDURE — 97129 THER IVNTJ 1ST 15 MIN: CPT | Performed by: SPEECH-LANGUAGE PATHOLOGIST

## 2023-07-10 PROCEDURE — 83735 ASSAY OF MAGNESIUM: CPT

## 2023-07-10 PROCEDURE — 2580000003 HC RX 258: Performed by: STUDENT IN AN ORGANIZED HEALTH CARE EDUCATION/TRAINING PROGRAM

## 2023-07-10 PROCEDURE — 97161 PT EVAL LOW COMPLEX 20 MIN: CPT

## 2023-07-10 PROCEDURE — 6370000000 HC RX 637 (ALT 250 FOR IP): Performed by: PHYSICIAN ASSISTANT

## 2023-07-10 PROCEDURE — 6370000000 HC RX 637 (ALT 250 FOR IP): Performed by: STUDENT IN AN ORGANIZED HEALTH CARE EDUCATION/TRAINING PROGRAM

## 2023-07-10 PROCEDURE — 97530 THERAPEUTIC ACTIVITIES: CPT

## 2023-07-10 RX ORDER — ATORVASTATIN CALCIUM 40 MG/1
20 TABLET, FILM COATED ORAL NIGHTLY
Qty: 30 TABLET | Refills: 3 | Status: SHIPPED | OUTPATIENT
Start: 2023-07-10

## 2023-07-10 RX ORDER — ASPIRIN 81 MG/1
81 TABLET, CHEWABLE ORAL DAILY
Qty: 30 TABLET | Refills: 3 | Status: SHIPPED | OUTPATIENT
Start: 2023-07-11

## 2023-07-10 RX ADMIN — ASPIRIN 81 MG 81 MG: 81 TABLET ORAL at 08:59

## 2023-07-10 RX ADMIN — NIFEDIPINE 60 MG: 60 TABLET, EXTENDED RELEASE ORAL at 08:59

## 2023-07-10 RX ADMIN — SODIUM CHLORIDE, PRESERVATIVE FREE 10 ML: 5 INJECTION INTRAVENOUS at 09:02

## 2023-07-10 NOTE — PLAN OF CARE
Problem: Chronic Conditions and Co-morbidities  Goal: Patient's chronic conditions and co-morbidity symptoms are monitored and maintained or improved  Outcome: Progressing  Flowsheets (Taken 7/9/2023 2000)  Care Plan - Patient's Chronic Conditions and Co-Morbidity Symptoms are Monitored and Maintained or Improved: Monitor and assess patient's chronic conditions and comorbid symptoms for stability, deterioration, or improvement     Problem: Discharge Planning  Goal: Discharge to home or other facility with appropriate resources  Outcome: Progressing     Problem: Safety - Adult  Goal: Free from fall injury  Outcome: Progressing  Flowsheets (Taken 7/9/2023 2000)  Free From Fall Injury: Instruct family/caregiver on patient safety     Problem: Skin/Tissue Integrity  Goal: Absence of new skin breakdown  Description: 1. Monitor for areas of redness and/or skin breakdown  2. Assess vascular access sites hourly  3. Every 4-6 hours minimum:  Change oxygen saturation probe site  4. Every 4-6 hours:  If on nasal continuous positive airway pressure, respiratory therapy assess nares and determine need for appliance change or resting period.   Outcome: Progressing     Problem: ABCDS Injury Assessment  Goal: Absence of physical injury  Outcome: Progressing

## 2023-07-10 NOTE — ACP (ADVANCE CARE PLANNING)
Advance Care Planning   Healthcare Decision Maker:      Click here to complete Healthcare Decision Makers including selection of the Healthcare Decision Maker Relationship (ie \"Primary\"). Pt is not ready to make a decision on who her decision maker is. I provided advance directives to pt.  CAMPBELL Escobar  7/10/2023

## 2023-07-10 NOTE — CARE COORDINATION
I met with pt and then her son and daughter arrived. Pt lives alone in a ranch home with 2 steps to enter from the garage. She has 5 grown children. Her daughter who was present is a rn in psych at [employer]. Pt said pt she was totally independent and driving. No hhc pta. Pt has a cane but not using it. Her p[place] Judi [place] and last saw him in sept of 2022. OT saw pt with ampac of 17 , PT to [facility]. Plan per pt is home tomorrow and then she said she will follow up at the [facility]. Provided pt with advance directives. She is not ready to make a decision today of who she wants to make her decision in a medical emergency when I asked her. Echo is pending, neurology and speech is following. Provided pt with hhc list. CAMPBELL Vela  7/10/2023    The Plan for Transition of Care is related to the following treatment goals: hhc   The Patient and/or rosa nt representative  was provided with a choice of provider and agrees   with the discharge plan. [x] Yes [] No  Freedom of choice list was provided with basic dialogue that supports the patient's individualized plan of care/goals, treatment preferences and shares the quality data associated with the providers. Yes [] No    Case Management Assessment  Initial Evaluation    Date/Time of Evaluation: 7/10/2023 12:00 PM  Assessment Completed by: CAMPBELL Vela    If patient is discharged prior to next notation, then this note serves as note for discharge by case management. Patient Name: Anitha Au                   YOB: 1931  Diagnosis: Acute CVA (cerebrovascular accident) [facility]) [I63.9]                   Date / Time: 7/7/2023 11:14 PM    Patient Admission Status: Inpatient   Readmission Risk (Low < 19, Mod (19-27), High > 27): Readmission Risk Score: 11.2    Current PCP: Cristy Luna, DO  PCP verified by JENNIFER?  Yes    Chart Reviewed: Yes      History Provided by: Patient  Patient Orientation: Alert and Oriented    Patient Cognition:

## 2023-07-19 ENCOUNTER — TELEPHONE (OUTPATIENT)
Dept: NEUROLOGY | Age: 88
End: 2023-07-19

## 2023-07-19 NOTE — TELEPHONE ENCOUNTER
Referral for stroke clinic received via fax. LM on dtr-colby phone to call office and schedule an appointment.  Saw Kathy Whiting in hospital.

## 2023-09-26 ENCOUNTER — OFFICE VISIT (OUTPATIENT)
Dept: NEUROLOGY | Age: 88
End: 2023-09-26
Payer: COMMERCIAL

## 2023-09-26 VITALS
DIASTOLIC BLOOD PRESSURE: 78 MMHG | HEIGHT: 65 IN | WEIGHT: 127 LBS | OXYGEN SATURATION: 100 % | RESPIRATION RATE: 18 BRPM | BODY MASS INDEX: 21.16 KG/M2 | HEART RATE: 80 BPM | SYSTOLIC BLOOD PRESSURE: 139 MMHG

## 2023-09-26 DIAGNOSIS — I63.9 ACUTE CVA (CEREBROVASCULAR ACCIDENT) (HCC): Primary | ICD-10-CM

## 2023-09-26 PROCEDURE — G8427 DOCREV CUR MEDS BY ELIG CLIN: HCPCS

## 2023-09-26 PROCEDURE — 1036F TOBACCO NON-USER: CPT

## 2023-09-26 PROCEDURE — 1123F ACP DISCUSS/DSCN MKR DOCD: CPT

## 2023-09-26 PROCEDURE — 99214 OFFICE O/P EST MOD 30 MIN: CPT

## 2023-09-26 PROCEDURE — G8420 CALC BMI NORM PARAMETERS: HCPCS

## 2023-09-26 PROCEDURE — 1090F PRES/ABSN URINE INCON ASSESS: CPT

## 2023-09-26 RX ORDER — LOSARTAN POTASSIUM 100 MG/1
100 TABLET ORAL DAILY
COMMUNITY

## 2023-09-26 RX ORDER — METOPROLOL SUCCINATE 25 MG/1
25 TABLET, EXTENDED RELEASE ORAL DAILY
COMMUNITY

## 2023-09-26 NOTE — PROGRESS NOTES
[address] Lizett Liu M.D., F.A.C.P. John Gilman, ESTELA, APRN, CNS  Freddie Gorman, MSN, APRN-FNP-C  Rocael Arreaga MSN, APRN, FNP-C  Harshad VIEYRA, PA-C  [address] [facility] MSN, APRN, FNP-C  Van Sheth, MSN, APRN, FNP-BC  [address]  Phone: [phone]  Fax: [phone]        Jonatan Sahu is a 80 y.o. right handed female     Patient presents to neurology clinic for evaluation management of her stroke    PMH of hypertension and A-fib    Assessment:     Left parietal stroke  Mechanism cardioembolic from A-fib, not on anticoagulation. S/p TNK  Presented with right-sided weakness, facial droop, and aphasia  Patient is now a started on Eliquis by cardiology, 2.5 mg twice daily    Plan:     Continue Eliquis 2.5 mg daily  As primary care about restarting a statin, LDL goal <70  Stroke education  Follow-up in 4 months  Call if needed sooner  Mod vasc risk factors: goal BP <140/90, LDL <70, A1C <7.0  Reviewed signs and symptoms of stroke including B.E.F.A.S. T:   Balance: Does the person have a sudden loss of balance? Eyes: Has the person lost vision in one or both eyes? Face: Does the person's face look uneven? Arm: Is one arm weaker or numb? Speech: Is the person's speech slurred? Does the person have trouble speaking or seen confused? Time: Call 9-1-1 immediately. History of Present Illness:     Patient presented to the ER on 7/10/23 after experiencing right sided weakness and aphasia. She says she was unable to write when trying to record her bills, and her daughter said she was speaking \"gibberish\" on the phone. NIH was 2 upon arrival and she was given TNK. She was noted to be in A-fib, was not on any anticoagulation. Has a history of A-fib per daughter but she had it only experienced it once. She had a 14-day monitor with no A-fib so she was not started on any anticoagulation.     CT head negative

## 2023-10-23 PROBLEM — I63.312 CEREBROVASCULAR ACCIDENT (CVA) DUE TO THROMBOSIS OF LEFT MIDDLE CEREBRAL ARTERY (HCC): Status: ACTIVE | Noted: 2023-10-23

## 2023-12-14 ENCOUNTER — HOSPITAL ENCOUNTER (OUTPATIENT)
Dept: RADIATION ONCOLOGY | Age: 88
Discharge: HOME | End: 2023-12-14
Payer: COMMERCIAL

## 2023-12-14 VITALS
TEMPERATURE: 97 F | BODY MASS INDEX: 21.13 KG/M2 | RESPIRATION RATE: 18 BRPM | WEIGHT: 127 LBS | DIASTOLIC BLOOD PRESSURE: 68 MMHG | SYSTOLIC BLOOD PRESSURE: 123 MMHG | HEART RATE: 87 BPM

## 2023-12-14 DIAGNOSIS — C44.91 SKIN CANCER, BASAL CELL: Primary | ICD-10-CM

## 2023-12-14 PROCEDURE — 99205 OFFICE O/P NEW HI 60 MIN: CPT

## 2023-12-14 PROCEDURE — 99205 OFFICE O/P NEW HI 60 MIN: CPT | Performed by: RADIOLOGY

## 2023-12-14 NOTE — PROGRESS NOTES
observable treatment room  2. Patient attended at all times by family member or staff  3. Provide assistance as indicated for ambulation activities  4. Reorient confused/cognitively impaired patient  5. Call-light/bell within patient's reach  6. Chair/bed in low position, stretcher/bed with siderails up except when performing patient care activities  7. Educate patient/family/caregiver on falls prevention  8. Falls risk precaution (Yellow sticker Level III) placed on patient chart           MALNUTRITION RISK SCREENING ASSESSMENT    Instructions:  Assess the patient and enter the appropriate indicators that are present for nutrition risk identification. Total the numbers entered and assign a risk score. Follow the appropriate action for total score listed below. Assessment   Date  12/14/2023     Have you lost weight without trying? 0- No     Have you been eating poorly because of a decreased appetite? 0- No   3. Do you have a diagnosis of head and neck cancer OR will you be receiving neoadjuvant treatment for breast cancer? 0- No                                                                                    TOTAL 0          Score of 0-1: No action  Score 2 or greater:   For Non-Diabetic Patient: Recommend adding Ensure Complete 2 x daily and provide patient with Ensure wellness bag with coupons  For Diabetic Patient: Recommend adding Glucerna Shake 2 x daily and provide patient with Glucerna Wellness bag with coupons  Route to the dietitian via [address] [place] [address]    Are you having difficulty performing daily routine tasks due to fatigue or weakness (ie: bathing/showering, dressing, housework, meal prep, work, , etc): No     Do you have any arm flexibility/ROM restrictions, swelling or pain that limit activity: No     Any changes in memory, attention/focus that impact daily activities: No     Do you avoid participation in leisure/social activity due to

## 2023-12-14 NOTE — PROGRESS NOTES
discussed with the patient and the her daughter goals and side effects of definitive course of radiation therapy to the area. In particular we have discussed the risk of adverse effect at the level of the right eye. For this reason I would like to use lens shield, even though the patient had already cataract surgery and therefore cataracts are not a concern for her. I explained to them at the other possible side effects of radiation therapy in terms of eye dryness and tearing. At the end of the discussion the patient and her daughter voiced understanding and were agreeable with the plan, she signed the consent. I am planning for 60 Gray in 30 fractions with the possible use of a lens shield. NCCN guidelines, version 2020 is used to help review treatment recommendations. Procedures and process involved with CT simulation and daily radiation treatments were explained. Toxicities, both expected and less common, were reviewed in detail. Questions were answered to their apparent satisfaction. I have spent 65 minutes reviewing the case, reviewing the literature regarding eyelid irradiation and examining the patient and discussing goals and side effects with her and her daughter. Thank you for the opportunity to participate in multidisciplinary management of this remarkable and pleasant patient.       Martha Pa MD    Department of Radiation Oncology  [facility]) [address]: [phone] ([facility]: [phone])  [address] Anais Wilks) [address]: [phone] ([facility]: [phone])  [facility]) [address]:  [phone] ([facility]:  [phone])

## 2024-01-09 DIAGNOSIS — T66.XXXA ADVERSE EFFECT OF RADIATION, INITIAL ENCOUNTER: Primary | ICD-10-CM

## 2024-01-09 RX ORDER — NEOMYCIN POLYMYXIN B SULFATES AND DEXAMETHASONE 3.5; 10000; 1 MG/ML; [USP'U]/ML; MG/ML
1 SUSPENSION/ DROPS OPHTHALMIC 4 TIMES DAILY
Qty: 2 ML | Refills: 2 | Status: SHIPPED | OUTPATIENT
Start: 2024-01-09 | End: 2024-02-08

## 2024-01-15 ENCOUNTER — HOSPITAL ENCOUNTER (OUTPATIENT)
Dept: RADIATION ONCOLOGY | Age: 89
Discharge: HOME | End: 2024-01-15
Payer: COMMERCIAL

## 2024-01-15 PROCEDURE — 77334 RADIATION TREATMENT AID(S): CPT | Performed by: RADIOLOGY

## 2024-01-15 PROCEDURE — 77290 THER RAD SIMULAJ FIELD CPLX: CPT | Performed by: RADIOLOGY

## 2024-01-16 ENCOUNTER — HOSPITAL ENCOUNTER (OUTPATIENT)
Dept: RADIATION ONCOLOGY | Age: 89
Discharge: HOME | End: 2024-01-16
Payer: COMMERCIAL

## 2024-01-16 PROCEDURE — 77300 RADIATION THERAPY DOSE PLAN: CPT | Performed by: RADIOLOGY

## 2024-01-16 PROCEDURE — 77332 RADIATION TREATMENT AID(S): CPT | Performed by: RADIOLOGY

## 2024-01-22 ENCOUNTER — APPOINTMENT (OUTPATIENT)
Dept: RADIATION ONCOLOGY | Age: 89
End: 2024-01-22
Payer: COMMERCIAL

## 2024-01-22 PROCEDURE — 77412 RADIATION TX DELIVERY LVL 3: CPT | Performed by: RADIOLOGY

## 2024-01-22 PROCEDURE — 77280 THER RAD SIMULAJ FIELD SMPL: CPT | Performed by: RADIOLOGY

## 2024-01-22 PROCEDURE — 77331 SPECIAL RADIATION DOSIMETRY: CPT | Performed by: RADIOLOGY

## 2024-01-23 ENCOUNTER — APPOINTMENT (OUTPATIENT)
Dept: RADIATION ONCOLOGY | Age: 89
End: 2024-01-23
Payer: COMMERCIAL

## 2024-01-23 PROCEDURE — 77412 RADIATION TX DELIVERY LVL 3: CPT | Performed by: RADIOLOGY

## 2024-01-24 ENCOUNTER — APPOINTMENT (OUTPATIENT)
Dept: RADIATION ONCOLOGY | Age: 89
End: 2024-01-24
Payer: COMMERCIAL

## 2024-01-24 VITALS
HEART RATE: 84 BPM | RESPIRATION RATE: 18 BRPM | SYSTOLIC BLOOD PRESSURE: 164 MMHG | DIASTOLIC BLOOD PRESSURE: 78 MMHG | TEMPERATURE: 97.8 F

## 2024-01-24 DIAGNOSIS — C44.91 SKIN CANCER, BASAL CELL: Primary | ICD-10-CM

## 2024-01-24 PROCEDURE — 77412 RADIATION TX DELIVERY LVL 3: CPT | Performed by: RADIOLOGY

## 2024-01-24 NOTE — PROGRESS NOTES
Kylah Gomez  2024  Wt Readings from Last 3 Encounters:   23 57.6 kg (127 lb)   23 57.6 kg (127 lb)   07/10/23 57.8 kg (127 lb 6.8 oz)     There is no height or weight on file to calculate BMI.      Treatment Area:Right eyelid    Patient was seen today for weekly visit.     Comfort Alteration  KPS:60%  Fatigue: None        Nutritional Alteration  Anorexia: No   Nausea: No   Vomiting: No    Skin Alteration   Sensation:none    Radiation Dermatitis:  none    Mucous Membrane Alteration  Drainage: No  Drainage Odor: No    Emotional  Coping: effective      Injury, potential bleeding or infection: potential      Other:na    Lab Results   Component Value Date    WBC 6.4 07/10/2023    HGB 12.6 07/10/2023    HCT 39.7 07/10/2023     07/10/2023         There were no vitals taken for this visit.  BP within normal range? no   -if no, manually recheck in 5-10 min  NEW BP readin/78, asymptomatic  BP within normal range? no   -if no, notify attending provider for further instruction      Assessment/Plan:Completed 3/30 fractions; 600/6000 cGy    Farhat Isabel RN      
reviewed. Goals of treatment and potential side effects were reviewed with the patient. Treatment imaging has been personally reviewed for accuracy and precision.    Questions answered to apparent satisfaction.    Treatments will continue as planned.    Thank you for the opportunity to participate in multidisciplinary management of this remarkable and pleasant patient.      Madison Duarte MD    Department of Radiation Oncology  [facility] ([facility]: [phone] (FAX: [phone])  [facility]: [phone] (FAX: [phone])  [facility]:  [phone] (FAX:  [phone])

## 2024-01-25 ENCOUNTER — HOSPITAL ENCOUNTER (OUTPATIENT)
Dept: CT IMAGING | Age: 89
Discharge: HOME | End: 2024-01-27

## 2024-01-25 ENCOUNTER — APPOINTMENT (OUTPATIENT)
Dept: RADIATION ONCOLOGY | Age: 89
End: 2024-01-25
Payer: COMMERCIAL

## 2024-01-25 PROCEDURE — 77412 RADIATION TX DELIVERY LVL 3: CPT | Performed by: RADIOLOGY

## 2024-01-26 ENCOUNTER — APPOINTMENT (OUTPATIENT)
Dept: RADIATION ONCOLOGY | Age: 89
End: 2024-01-26
Payer: COMMERCIAL

## 2024-01-29 ENCOUNTER — APPOINTMENT (OUTPATIENT)
Dept: RADIATION ONCOLOGY | Age: 89
End: 2024-01-29
Payer: COMMERCIAL

## 2024-01-29 PROCEDURE — 77412 RADIATION TX DELIVERY LVL 3: CPT | Performed by: RADIOLOGY

## 2024-01-30 ENCOUNTER — APPOINTMENT (OUTPATIENT)
Dept: RADIATION ONCOLOGY | Age: 89
End: 2024-01-30
Payer: COMMERCIAL

## 2024-01-30 PROCEDURE — 77412 RADIATION TX DELIVERY LVL 3: CPT | Performed by: RADIOLOGY

## 2024-01-31 ENCOUNTER — HOSPITAL ENCOUNTER (OUTPATIENT)
Dept: RADIATION ONCOLOGY | Age: 89
Discharge: HOME | End: 2024-01-31
Payer: COMMERCIAL

## 2024-01-31 ENCOUNTER — APPOINTMENT (OUTPATIENT)
Dept: RADIATION ONCOLOGY | Age: 89
End: 2024-01-31
Payer: COMMERCIAL

## 2024-01-31 VITALS
RESPIRATION RATE: 18 BRPM | SYSTOLIC BLOOD PRESSURE: 140 MMHG | HEART RATE: 85 BPM | TEMPERATURE: 97.1 F | DIASTOLIC BLOOD PRESSURE: 85 MMHG

## 2024-01-31 DIAGNOSIS — C44.91 SKIN CANCER, BASAL CELL: Primary | ICD-10-CM

## 2024-01-31 PROCEDURE — 77412 RADIATION TX DELIVERY LVL 3: CPT | Performed by: RADIOLOGY

## 2024-01-31 NOTE — PROGRESS NOTES
Kylah Gomez  1/31/2024  Wt Readings from Last 3 Encounters:   12/14/23 57.6 kg (127 lb)   09/26/23 57.6 kg (127 lb)   07/10/23 57.8 kg (127 lb 6.8 oz)     There is no height or weight on file to calculate BMI.      Treatment Area:right eyelid    Patient was seen today for weekly visit.     Comfort Alteration  KPS:60%  Fatigue: None        Nutritional Alteration  Anorexia: No   Nausea: No   Vomiting: No    Skin Alteration   Sensation:none    Radiation Dermatitis:  slight redness    Mucous Membrane Alteration  Drainage: No  Drainage Odor: No    Emotional  Coping: effective      Injury, potential bleeding or infection: na      Other:na    Lab Results   Component Value Date    WBC 6.4 07/10/2023    HGB 12.6 07/10/2023    HCT 39.7 07/10/2023     07/10/2023         BP (!) 140/85   Pulse 85   Temp 97.1 °F (36.2 °C) (Temporal)   Resp 18   BP within normal range? yes       Assessment/Plan:Completed  8/30 fractions; 1600/6000 cGy    Farhat Isabel RN

## 2024-01-31 NOTE — PROGRESS NOTES
DEPARTMENT OF RADIATION ONCOLOGY   ON TREATMENT VISIT       1/31/2024      NAME:  Kylah Gomez    YOB: 1931    DIAGNOSIS:  Clinical stage I basal cell carcinoma of the lower eyelid/right cheek SP shave biopsy    SUBJECTIVE:   Kylah Gomez has now received 1600 cGy in 8 fractions directed to the right inferior eyelid.      Past medical, surgical, social and family histories reviewed and updated as indicated.    PAIN: No    ALLERGIES:  Patient has no known allergies.         Current Outpatient Medications   Medication Sig Dispense Refill    lidocaine (AKTEN) 3.5 % GEL Place 20 drops into the left eye daily 1 mL 4    neomycin-polymyxin-dexAMETHasone (MAXITROL) 0.1 % ophthalmic suspension Place 1 drop into the left eye 4 times daily 2 mL 2    metoprolol succinate (TOPROL XL) 25 MG extended release tablet Take 1 tablet by mouth daily      losartan (COZAAR) 100 MG tablet Take 1 tablet by mouth daily      NIFEdipine (ADALAT CC) 60 MG extended release tablet Take 1 tablet by mouth daily      apixaban (ELIQUIS) 2.5 MG TABS tablet Take 1 tablet by mouth 2 times daily 60 tablet 0     No current facility-administered medications for this encounter.         OBJECTIVE:  Alert and fully ambulatory. Pleasant and conversant.      Physical Examination:General appearance - alert, well appearing, and in no distress and normal appearing weight:  Constitutional: A well developed, well nourished 89 y.o. female who is alert, oriented, cooperative and in no apparent distress.  HEENT:   Skin:  Warm and dry.  No obvious rashes.    Vitals:    01/31/24 1428   BP: (!) 140/85   Pulse: 85   Resp: 18   Temp: 97.1 °F (36.2 °C)   TempSrc: Temporal       Wt Readings from Last 3 Encounters:   12/14/23 57.6 kg (127 lb)   09/26/23 57.6 kg (127 lb)   07/10/23 57.8 kg (127 lb 6.8 oz)       ASSESSMENT/PLAN:     Patient is tolerating treatments well with expected toxicities.  No relevant toxicities.    Current and planned dose

## 2024-02-01 ENCOUNTER — HOSPITAL ENCOUNTER (OUTPATIENT)
Dept: RADIATION ONCOLOGY | Age: 89
Discharge: HOME | End: 2024-02-01
Payer: COMMERCIAL

## 2024-02-01 PROCEDURE — 77412 RADIATION TX DELIVERY LVL 3: CPT | Performed by: RADIOLOGY

## 2024-02-02 ENCOUNTER — HOSPITAL ENCOUNTER (OUTPATIENT)
Dept: RADIATION ONCOLOGY | Age: 89
Discharge: HOME | End: 2024-02-02
Payer: COMMERCIAL

## 2024-02-02 PROCEDURE — 77412 RADIATION TX DELIVERY LVL 3: CPT | Performed by: RADIOLOGY

## 2024-02-02 PROCEDURE — 77336 RADIATION PHYSICS CONSULT: CPT | Performed by: RADIOLOGY

## 2024-02-05 ENCOUNTER — APPOINTMENT (OUTPATIENT)
Dept: RADIATION ONCOLOGY | Age: 89
End: 2024-02-05
Payer: COMMERCIAL

## 2024-02-05 PROCEDURE — 77412 RADIATION TX DELIVERY LVL 3: CPT | Performed by: RADIOLOGY

## 2024-02-06 ENCOUNTER — APPOINTMENT (OUTPATIENT)
Dept: RADIATION ONCOLOGY | Age: 89
End: 2024-02-06
Payer: COMMERCIAL

## 2024-02-06 PROCEDURE — 77412 RADIATION TX DELIVERY LVL 3: CPT | Performed by: RADIOLOGY

## 2024-02-07 ENCOUNTER — APPOINTMENT (OUTPATIENT)
Dept: RADIATION ONCOLOGY | Age: 89
End: 2024-02-07
Payer: COMMERCIAL

## 2024-02-07 VITALS
RESPIRATION RATE: 18 BRPM | BODY MASS INDEX: 20.8 KG/M2 | WEIGHT: 125 LBS | SYSTOLIC BLOOD PRESSURE: 132 MMHG | TEMPERATURE: 97.1 F | HEART RATE: 91 BPM | DIASTOLIC BLOOD PRESSURE: 77 MMHG

## 2024-02-07 DIAGNOSIS — C44.91 SKIN CANCER, BASAL CELL: Primary | ICD-10-CM

## 2024-02-07 PROCEDURE — 77412 RADIATION TX DELIVERY LVL 3: CPT | Performed by: RADIOLOGY

## 2024-02-07 NOTE — PROGRESS NOTES
Kylah Gomez  2/7/2024  Wt Readings from Last 3 Encounters:   02/07/24 56.7 kg (125 lb)   12/14/23 57.6 kg (127 lb)   09/26/23 57.6 kg (127 lb)     Body mass index is 20.8 kg/m².      Treatment Area:Rioght eyelid    Patient was seen today for weekly visit.     Comfort Alteration  KPS:60%  Fatigue: Mild        Nutritional Alteration  Anorexia: No   Nausea: No   Vomiting: No    Skin Alteration   Sensation:none    Radiation Dermatitis:  none    Mucous Membrane Alteration  Drainage: No  Drainage Odor: No    Emotional  Coping: effective      Injury, potential bleeding or infection: na      Other:na    Lab Results   Component Value Date    WBC 6.4 07/10/2023    HGB 12.6 07/10/2023    HCT 39.7 07/10/2023     07/10/2023         Pulse 91   Temp 97.1 °F (36.2 °C) (Temporal)   Resp 18   Wt 56.7 kg (125 lb)   BMI 20.80 kg/m²   BP within normal range? yes       Assessment/Plan:Completed 13/30 fractions; 2600/6000 cGy    Farhat Isabel RN

## 2024-02-07 NOTE — PROGRESS NOTES
DEPARTMENT OF RADIATION ONCOLOGY   ON TREATMENT VISIT       2/7/2024      NAME:  Kylah Gomez    YOB: 1931    DIAGNOSIS:  Clinical stage I basal cell carcinoma of the lower eyelid/right cheek SP shave biopsy    SUBJECTIVE:   Kylah Gomez has now received 2600 cGy in 13 fractions directed to the right inferior eyelid.      Past medical, surgical, social and family histories reviewed and updated as indicated.    PAIN: No    ALLERGIES:  Patient has no known allergies.         Current Outpatient Medications   Medication Sig Dispense Refill    lidocaine (AKTEN) 3.5 % GEL Place 20 drops into the left eye daily 1 mL 4    neomycin-polymyxin-dexAMETHasone (MAXITROL) 0.1 % ophthalmic suspension Place 1 drop into the left eye 4 times daily 2 mL 2    metoprolol succinate (TOPROL XL) 25 MG extended release tablet Take 1 tablet by mouth daily      losartan (COZAAR) 100 MG tablet Take 1 tablet by mouth daily      NIFEdipine (ADALAT CC) 60 MG extended release tablet Take 1 tablet by mouth daily      apixaban (ELIQUIS) 2.5 MG TABS tablet Take 1 tablet by mouth 2 times daily 60 tablet 0     No current facility-administered medications for this encounter.         OBJECTIVE:  Alert and fully ambulatory. Pleasant and conversant.      Physical Examination:General appearance - alert, well appearing, and in no distress and normal appearing weight:  Constitutional: A well developed, well nourished 89 y.o. female who is alert, oriented, cooperative and in no apparent distress.  HEENT:   Skin:  Warm and dry.  No obvious rashes.    Vitals:    02/07/24 1503   BP: 132/77   Pulse: 91   Resp: 18   Temp: 97.1 °F (36.2 °C)   TempSrc: Temporal   Weight: 56.7 kg (125 lb)       Wt Readings from Last 3 Encounters:   02/07/24 56.7 kg (125 lb)   12/14/23 57.6 kg (127 lb)   09/26/23 57.6 kg (127 lb)       ASSESSMENT/PLAN:     Patient is tolerating treatments well with expected toxicities.    Current and planned dose reviewed.

## 2024-02-08 ENCOUNTER — APPOINTMENT (OUTPATIENT)
Dept: RADIATION ONCOLOGY | Age: 89
End: 2024-02-08
Payer: COMMERCIAL

## 2024-02-08 PROCEDURE — 77412 RADIATION TX DELIVERY LVL 3: CPT | Performed by: RADIOLOGY

## 2024-02-09 ENCOUNTER — APPOINTMENT (OUTPATIENT)
Dept: RADIATION ONCOLOGY | Age: 89
End: 2024-02-09
Payer: COMMERCIAL

## 2024-02-09 PROCEDURE — 77336 RADIATION PHYSICS CONSULT: CPT | Performed by: RADIOLOGY

## 2024-02-09 PROCEDURE — 77412 RADIATION TX DELIVERY LVL 3: CPT | Performed by: RADIOLOGY

## 2024-02-12 ENCOUNTER — APPOINTMENT (OUTPATIENT)
Dept: RADIATION ONCOLOGY | Age: 89
End: 2024-02-12
Payer: COMMERCIAL

## 2024-02-12 PROCEDURE — 77412 RADIATION TX DELIVERY LVL 3: CPT | Performed by: RADIOLOGY

## 2024-02-13 ENCOUNTER — APPOINTMENT (OUTPATIENT)
Dept: RADIATION ONCOLOGY | Age: 89
End: 2024-02-13
Payer: COMMERCIAL

## 2024-02-13 PROCEDURE — 77412 RADIATION TX DELIVERY LVL 3: CPT | Performed by: RADIOLOGY

## 2024-02-14 ENCOUNTER — APPOINTMENT (OUTPATIENT)
Dept: RADIATION ONCOLOGY | Age: 89
End: 2024-02-14
Payer: COMMERCIAL

## 2024-02-14 VITALS
TEMPERATURE: 96.9 F | HEART RATE: 91 BPM | BODY MASS INDEX: 20.8 KG/M2 | OXYGEN SATURATION: 98 % | DIASTOLIC BLOOD PRESSURE: 99 MMHG | RESPIRATION RATE: 18 BRPM | SYSTOLIC BLOOD PRESSURE: 172 MMHG | WEIGHT: 125 LBS

## 2024-02-14 DIAGNOSIS — C44.91 SKIN CANCER, BASAL CELL: Primary | ICD-10-CM

## 2024-02-14 PROCEDURE — 77412 RADIATION TX DELIVERY LVL 3: CPT | Performed by: RADIOLOGY

## 2024-02-14 NOTE — PROGRESS NOTES
DEPARTMENT OF RADIATION ONCOLOGY   ON TREATMENT VISIT       2/14/2024      NAME:  Kylah Gomez    YOB: 1931    DIAGNOSIS:  Clinical stage I basal cell carcinoma of the lower eyelid/right cheek SP shave biopsy    SUBJECTIVE:   Kylah Gomez has now received 3600 cGy in 18 fractions directed to the right lower eyelid.      Past medical, surgical, social and family histories reviewed and updated as indicated.    PAIN: No    ALLERGIES:  Patient has no known allergies.         Current Outpatient Medications   Medication Sig Dispense Refill    lidocaine (AKTEN) 3.5 % GEL Place 20 drops into the left eye daily 1 mL 4    metoprolol succinate (TOPROL XL) 25 MG extended release tablet Take 1 tablet by mouth daily      losartan (COZAAR) 100 MG tablet Take 1 tablet by mouth daily      NIFEdipine (ADALAT CC) 60 MG extended release tablet Take 1 tablet by mouth daily      apixaban (ELIQUIS) 2.5 MG TABS tablet Take 1 tablet by mouth 2 times daily 60 tablet 0     No current facility-administered medications for this encounter.         OBJECTIVE:  Alert and fully ambulatory. Pleasant and conversant.      Physical Examination:General appearance - alert, well appearing, and in no distress and normal appearing weight:  Constitutional: A well developed, well nourished 89 y.o. female who is alert, oriented, cooperative and in no apparent distress.  HEENT:   Skin:  Warm and dry.  No obvious rashes.    Vitals:    02/14/24 1506   BP: (!) 172/99   Pulse: 91   Resp: 18   Temp: 96.9 °F (36.1 °C)   TempSrc: Temporal   SpO2: 98%   Weight: 56.7 kg (125 lb)       Wt Readings from Last 3 Encounters:   02/14/24 56.7 kg (125 lb)   02/07/24 56.7 kg (125 lb)   12/14/23 57.6 kg (127 lb)       ASSESSMENT/PLAN:     Patient is tolerating treatments well with expected toxicities.  Tolerating the treatment well.    Current and planned dose reviewed. Goals of treatment and potential side effects were reviewed with the patient.

## 2024-02-14 NOTE — PROGRESS NOTES
Kylah Gomez  2024  Wt Readings from Last 3 Encounters:   24 56.7 kg (125 lb)   24 56.7 kg (125 lb)   23 57.6 kg (127 lb)     Body mass index is 20.8 kg/m².      Treatment Area:Right Eyelid    Patient was seen today for weekly visit.     Comfort Alteration  KPS:80%  Fatigue: Mild        Nutritional Alteration  Anorexia: No   Nausea: No   Vomiting: No    Skin Alteration   Sensation:slightly reddened    Radiation Dermatitis:  no    Mucous Membrane Alteration  Drainage: No  Drainage Odor: No    Emotional  Coping: effective      Injury, potential bleeding or infection: no      Other:na    Lab Results   Component Value Date    WBC 6.4 07/10/2023    HGB 12.6 07/10/2023    HCT 39.7 07/10/2023     07/10/2023         BP (!) 172/99   Pulse 91   Temp 96.9 °F (36.1 °C) (Temporal)   Resp 18   Wt 56.7 kg (125 lb)   SpO2 98%   BMI 20.80 kg/m²   BP within normal range? no   -if no, manually recheck in 5-10 min  NEW BP readin/109  BP within normal range? no   -if no, notify attending provider for further instruction      Assessment/Plan:18/30fx;3600/6000cGy completed.    Vee Baeza RN

## 2024-02-15 ENCOUNTER — APPOINTMENT (OUTPATIENT)
Dept: RADIATION ONCOLOGY | Age: 89
End: 2024-02-15
Payer: COMMERCIAL

## 2024-02-15 PROCEDURE — 77412 RADIATION TX DELIVERY LVL 3: CPT | Performed by: RADIOLOGY

## 2024-02-16 ENCOUNTER — APPOINTMENT (OUTPATIENT)
Dept: RADIATION ONCOLOGY | Age: 89
End: 2024-02-16
Payer: COMMERCIAL

## 2024-02-16 PROCEDURE — 77412 RADIATION TX DELIVERY LVL 3: CPT | Performed by: RADIOLOGY

## 2024-02-16 PROCEDURE — 77336 RADIATION PHYSICS CONSULT: CPT | Performed by: RADIOLOGY

## 2024-02-19 ENCOUNTER — APPOINTMENT (OUTPATIENT)
Dept: RADIATION ONCOLOGY | Age: 89
End: 2024-02-19
Payer: COMMERCIAL

## 2024-02-19 PROCEDURE — 77412 RADIATION TX DELIVERY LVL 3: CPT | Performed by: RADIOLOGY

## 2024-02-20 ENCOUNTER — APPOINTMENT (OUTPATIENT)
Dept: RADIATION ONCOLOGY | Age: 89
End: 2024-02-20
Payer: COMMERCIAL

## 2024-02-20 PROCEDURE — 77412 RADIATION TX DELIVERY LVL 3: CPT | Performed by: RADIOLOGY

## 2024-02-21 ENCOUNTER — APPOINTMENT (OUTPATIENT)
Dept: RADIATION ONCOLOGY | Age: 89
End: 2024-02-21
Payer: COMMERCIAL

## 2024-02-21 ENCOUNTER — HOSPITAL ENCOUNTER (OUTPATIENT)
Dept: RADIATION ONCOLOGY | Age: 89
Discharge: HOME | End: 2024-02-21
Payer: COMMERCIAL

## 2024-02-21 VITALS
WEIGHT: 125 LBS | RESPIRATION RATE: 18 BRPM | OXYGEN SATURATION: 97 % | HEART RATE: 83 BPM | BODY MASS INDEX: 20.8 KG/M2 | TEMPERATURE: 97.6 F

## 2024-02-21 DIAGNOSIS — C44.91 SKIN CANCER, BASAL CELL: Primary | ICD-10-CM

## 2024-02-21 PROCEDURE — 77412 RADIATION TX DELIVERY LVL 3: CPT | Performed by: RADIOLOGY

## 2024-02-21 NOTE — PROGRESS NOTES
Kylah Gomez  2024  Wt Readings from Last 3 Encounters:   24 56.7 kg (125 lb)   24 56.7 kg (125 lb)   24 56.7 kg (125 lb)     Body mass index is 20.8 kg/m².      Treatment Area:Right eyelid    Patient was seen today for weekly visit.     Comfort Alteration  KPS:80%  Fatigue: Mild        Nutritional Alteration  Anorexia: No   Nausea: No   Vomiting: No    Skin Alteration   Sensation:Red    Radiation Dermatitis:  yes    Mucous Membrane Alteration  Drainage: No  Drainage Odor: No    Emotional  Coping: effective      Injury, potential bleeding or infection: no      Other:na    Lab Results   Component Value Date    WBC 6.4 07/10/2023    HGB 12.6 07/10/2023    HCT 39.7 07/10/2023     07/10/2023         Pulse 83   Temp 97.6 °F (36.4 °C) (Temporal)   Resp 18   Wt 56.7 kg (125 lb)   SpO2 97%   BMI 20.80 kg/m²   BP within normal range? no   -if no, manually recheck in 5-10 min  NEW BP readin/83  BP within normal range? no   -if no, notify attending provider for further instruction. Patient denies any headaches, chest pains, nosebleeds, or any hypertensive symptoms at this time. Instructed patient if she begins to feel symptomatic to call their PCP, go to an Urgent Care facility or ED. Patient states she see's her PCP next week.        Assessment/Plan:23/30fx;4600/6000cGy completed.    Vee Baeza RN

## 2024-02-21 NOTE — PROGRESS NOTES
DEPARTMENT OF RADIATION ONCOLOGY   ON TREATMENT VISIT       2/21/2024      NAME:  Kylah Gomez    YOB: 1931    DIAGNOSIS:  Clinical stage I basal cell carcinoma of the lower eyelid/right cheek SP shave biopsy    SUBJECTIVE:   Kylah Gomez has now received 4600 cGy in 23 fractions directed to the right lower eyelid.      Past medical, surgical, social and family histories reviewed and updated as indicated.    PAIN: No    ALLERGIES:  Patient has no known allergies.         Current Outpatient Medications   Medication Sig Dispense Refill    lidocaine (AKTEN) 3.5 % GEL Place 20 drops into the left eye daily 1 mL 4    metoprolol succinate (TOPROL XL) 25 MG extended release tablet Take 1 tablet by mouth daily      losartan (COZAAR) 100 MG tablet Take 1 tablet by mouth daily      NIFEdipine (ADALAT CC) 60 MG extended release tablet Take 1 tablet by mouth daily      apixaban (ELIQUIS) 2.5 MG TABS tablet Take 1 tablet by mouth 2 times daily 60 tablet 0     No current facility-administered medications for this encounter.         OBJECTIVE:  Alert and fully ambulatory. Pleasant and conversant.      Physical Examination:General appearance - alert, well appearing, and in no distress and normal appearing weight:  Constitutional: A well developed, well nourished 89 y.o. female who is alert, oriented, cooperative and in no apparent distress.  HEENT:   Skin:  Warm and dry.  No obvious rashes.    Vitals:    02/21/24 1527   Pulse: 83   Resp: 18   Temp: 97.6 °F (36.4 °C)   TempSrc: Temporal   SpO2: 97%   Weight: 56.7 kg (125 lb)       Wt Readings from Last 3 Encounters:   02/21/24 56.7 kg (125 lb)   02/14/24 56.7 kg (125 lb)   02/07/24 56.7 kg (125 lb)       ASSESSMENT/PLAN:     Patient is tolerating treatments well with expected toxicities.  Moderate erythema, no desquamation.  Continue XRT.    Current and planned dose reviewed. Goals of treatment and potential side effects were reviewed with the patient.

## 2024-02-22 ENCOUNTER — APPOINTMENT (OUTPATIENT)
Dept: RADIATION ONCOLOGY | Age: 89
End: 2024-02-22
Payer: COMMERCIAL

## 2024-02-22 PROCEDURE — 77412 RADIATION TX DELIVERY LVL 3: CPT | Performed by: RADIOLOGY

## 2024-02-23 ENCOUNTER — APPOINTMENT (OUTPATIENT)
Dept: RADIATION ONCOLOGY | Age: 89
End: 2024-02-23
Payer: COMMERCIAL

## 2024-02-23 PROCEDURE — 77336 RADIATION PHYSICS CONSULT: CPT | Performed by: RADIOLOGY

## 2024-02-23 PROCEDURE — 77412 RADIATION TX DELIVERY LVL 3: CPT | Performed by: RADIOLOGY

## 2024-02-26 ENCOUNTER — APPOINTMENT (OUTPATIENT)
Dept: RADIATION ONCOLOGY | Age: 89
End: 2024-02-26
Payer: COMMERCIAL

## 2024-02-26 PROCEDURE — 77412 RADIATION TX DELIVERY LVL 3: CPT | Performed by: RADIOLOGY

## 2024-02-27 ENCOUNTER — APPOINTMENT (OUTPATIENT)
Dept: RADIATION ONCOLOGY | Age: 89
End: 2024-02-27
Payer: COMMERCIAL

## 2024-02-27 PROCEDURE — 77412 RADIATION TX DELIVERY LVL 3: CPT | Performed by: RADIOLOGY

## 2024-02-28 ENCOUNTER — APPOINTMENT (OUTPATIENT)
Dept: RADIATION ONCOLOGY | Age: 89
End: 2024-02-28
Payer: COMMERCIAL

## 2024-02-28 ENCOUNTER — HOSPITAL ENCOUNTER (OUTPATIENT)
Dept: RADIATION ONCOLOGY | Age: 89
Discharge: HOME | End: 2024-02-28
Payer: COMMERCIAL

## 2024-02-28 VITALS
DIASTOLIC BLOOD PRESSURE: 77 MMHG | RESPIRATION RATE: 18 BRPM | OXYGEN SATURATION: 96 % | BODY MASS INDEX: 20.8 KG/M2 | SYSTOLIC BLOOD PRESSURE: 133 MMHG | TEMPERATURE: 97.6 F | HEART RATE: 91 BPM | WEIGHT: 125 LBS

## 2024-02-28 DIAGNOSIS — C44.91 SKIN CANCER, BASAL CELL: Primary | ICD-10-CM

## 2024-02-28 PROCEDURE — 77412 RADIATION TX DELIVERY LVL 3: CPT | Performed by: RADIOLOGY

## 2024-02-28 NOTE — PROGRESS NOTES
Kylah Gomez  2/28/2024  Wt Readings from Last 3 Encounters:   02/28/24 56.7 kg (125 lb)   02/21/24 56.7 kg (125 lb)   02/14/24 56.7 kg (125 lb)     Body mass index is 20.8 kg/m².        Treatment Area:right eyelid    Patient was seen today for weekly visit.   Comfort Alteration  KPS:80%  Fatigue: None    Mucous Membrane Alteration  Mucositis Due to Radiation: No  Thrush: No  Voice Changes: No    Nutritional Alteration  Anorexia: No   Nausea: No   Vomiting: No     Skin Alteration   Sensation:Red    Radiation Dermatitis:  yes    Ventilation Alteration  Cough:No    Emotional  Coping: effective    Injury, potential bleeding or infection: no    Radioprotectant Tolerance  Yes            Lab Results   Component Value Date    WBC 6.4 07/10/2023    HGB 12.6 07/10/2023    HCT 39.7 07/10/2023     07/10/2023         /77   Pulse 91   Temp 97.6 °F (36.4 °C) (Temporal)   Resp 18   Wt 56.7 kg (125 lb)   SpO2 96%   BMI 20.80 kg/m²   BP within normal range? yes       Assessment/Plan:28/33x;5600/6600cGy completed.    Vee Baeza RN  
Treatment imaging has been personally reviewed for accuracy and precision.    Questions answered to apparent satisfaction.    Treatments will continue as planned.    Thank you for the opportunity to participate in multidisciplinary management of this remarkable and pleasant patient.      Madison Duarte MD    Department of Radiation Oncology  [facility]: [phone] (FAX: [phone])  [facility]: [phone] (FAX: [phone])  [facility]:  [phone] (FAX:  [phone])

## 2024-02-29 ENCOUNTER — APPOINTMENT (OUTPATIENT)
Dept: RADIATION ONCOLOGY | Age: 89
End: 2024-02-29
Payer: COMMERCIAL

## 2024-03-01 ENCOUNTER — APPOINTMENT (OUTPATIENT)
Dept: RADIATION ONCOLOGY | Age: 89
End: 2024-03-01
Payer: COMMERCIAL

## 2024-03-04 ENCOUNTER — HOSPITAL ENCOUNTER (OUTPATIENT)
Dept: RADIATION ONCOLOGY | Age: 89
Discharge: HOME | End: 2024-03-04
Payer: COMMERCIAL

## 2024-03-04 PROCEDURE — 77412 RADIATION TX DELIVERY LVL 3: CPT | Performed by: RADIOLOGY

## 2024-03-05 ENCOUNTER — HOSPITAL ENCOUNTER (OUTPATIENT)
Dept: RADIATION ONCOLOGY | Age: 89
Discharge: HOME | End: 2024-03-05
Payer: COMMERCIAL

## 2024-03-05 PROCEDURE — 77427 RADIATION TX MANAGEMENT X5: CPT | Performed by: RADIOLOGY

## 2024-03-05 PROCEDURE — 77336 RADIATION PHYSICS CONSULT: CPT | Performed by: RADIOLOGY

## 2024-03-05 PROCEDURE — 77412 RADIATION TX DELIVERY LVL 3: CPT | Performed by: RADIOLOGY

## 2024-03-06 ENCOUNTER — HOSPITAL ENCOUNTER (OUTPATIENT)
Dept: RADIATION ONCOLOGY | Age: 89
Discharge: HOME | End: 2024-03-06
Payer: COMMERCIAL

## 2024-03-06 DIAGNOSIS — C44.91 SKIN CANCER, BASAL CELL: Primary | ICD-10-CM

## 2024-03-06 PROCEDURE — 77412 RADIATION TX DELIVERY LVL 3: CPT | Performed by: RADIOLOGY

## 2024-03-06 NOTE — PROGRESS NOTES
DEPARTMENT OF RADIATION ONCOLOGY   ON TREATMENT VISIT       3/6/2024      NAME:  Kylah Gomez    YOB: 1931    DIAGNOSIS:  Clinical stage I basal cell carcinoma of the lower eyelid/right cheek SP shave biopsy    SUBJECTIVE:   Kylah Gomez has now received 6200 cGy in 31 fractions directed to the right lower eyelid.      Past medical, surgical, social and family histories reviewed and updated as indicated.    PAIN: No    ALLERGIES:  Patient has no known allergies.         Current Outpatient Medications   Medication Sig Dispense Refill    lidocaine (AKTEN) 3.5 % GEL Place 20 drops into the left eye daily 1 mL 4    metoprolol succinate (TOPROL XL) 25 MG extended release tablet Take 1 tablet by mouth daily      losartan (COZAAR) 100 MG tablet Take 1 tablet by mouth daily      NIFEdipine (ADALAT CC) 60 MG extended release tablet Take 1 tablet by mouth daily      apixaban (ELIQUIS) 2.5 MG TABS tablet Take 1 tablet by mouth 2 times daily 60 tablet 0     No current facility-administered medications for this encounter.         OBJECTIVE:  Alert and fully ambulatory. Pleasant and conversant.      Physical Examination:General appearance - alert, well appearing, and in no distress and normal appearing weight:  Constitutional: A well developed, well nourished 89 y.o. female who is alert, oriented, cooperative and in no apparent distress.  HEENT:   Skin:  Warm and dry.  No obvious rashes.    There were no vitals filed for this visit.    Wt Readings from Last 3 Encounters:   02/28/24 56.7 kg (125 lb)   02/21/24 56.7 kg (125 lb)   02/14/24 56.7 kg (125 lb)       ASSESSMENT/PLAN:     Patient is tolerating treatments well with expected toxicities.  Erythema grade 2, no skin peeling.  Clinical disappearance of the skin cancer.    Current and planned dose reviewed. Goals of treatment and potential side effects were reviewed with the patient. Treatment imaging has been personally reviewed for accuracy and

## 2024-03-07 ENCOUNTER — HOSPITAL ENCOUNTER (OUTPATIENT)
Dept: RADIATION ONCOLOGY | Age: 89
Discharge: HOME | End: 2024-03-07
Payer: COMMERCIAL

## 2024-03-07 PROCEDURE — 77412 RADIATION TX DELIVERY LVL 3: CPT | Performed by: RADIOLOGY

## 2024-03-08 ENCOUNTER — HOSPITAL ENCOUNTER (OUTPATIENT)
Dept: RADIATION ONCOLOGY | Age: 89
Discharge: HOME | End: 2024-03-08
Payer: COMMERCIAL

## 2024-03-08 PROCEDURE — 77412 RADIATION TX DELIVERY LVL 3: CPT | Performed by: RADIOLOGY

## 2024-03-08 NOTE — PROGRESS NOTES
Kylah Gomez  3/8/2024  8:17 AM          Current Outpatient Medications   Medication Sig Dispense Refill    lidocaine (AKTEN) 3.5 % GEL Place 20 drops into the left eye daily 1 mL 4    metoprolol succinate (TOPROL XL) 25 MG extended release tablet Take 1 tablet by mouth daily      losartan (COZAAR) 100 MG tablet Take 1 tablet by mouth daily      NIFEdipine (ADALAT CC) 60 MG extended release tablet Take 1 tablet by mouth daily      apixaban (ELIQUIS) 2.5 MG TABS tablet Take 1 tablet by mouth 2 times daily 60 tablet 0     No current facility-administered medications for this encounter.          This is an up-to-date medication list.    Please take this list to your next care provider, and discard any previous medication lists.

## 2024-04-16 ENCOUNTER — HOSPITAL ENCOUNTER (OUTPATIENT)
Dept: RADIATION ONCOLOGY | Age: 89
Discharge: HOME | End: 2024-04-16

## 2024-04-16 VITALS
WEIGHT: 127.8 LBS | DIASTOLIC BLOOD PRESSURE: 80 MMHG | SYSTOLIC BLOOD PRESSURE: 138 MMHG | HEART RATE: 88 BPM | TEMPERATURE: 97.2 F | BODY MASS INDEX: 21.27 KG/M2 | RESPIRATION RATE: 18 BRPM

## 2024-04-16 DIAGNOSIS — C44.91 SKIN CANCER, BASAL CELL: Primary | ICD-10-CM

## 2024-04-16 NOTE — PROGRESS NOTES
DEPARTMENT OF RADIATION ONCOLOGY   Follow up visit        2024    NAME:  Kylah Gomez    :  1931 89 y.o. female     PCP: Ronn Dias DO    DIAGNOSIS:  1. Skin cancer, basal cell     Clinical stage I basal cell carcinoma of the lower eyelid/right cheek SP shave biopsy    STAGING: Cancer Staging   Skin cancer, basal cell  Staging form: Cutaneous Squamous Cell Carcinoma Of The Head And Neck, AJCC 8th Edition  - Clinical stage from 2023: Stage I (cT1, cN0, cM0) - Signed by Madison Duarte MD on 2023      RECENT HISTORY: Kylah Gomez is now 1 months out from completion of RT to the right lower eyelid.  She is doing very well, no signs of skin cancer at this point, good healing of the skin with minor discoloration.  Loss of the eyelashes.    Past medical, surgical, social and family histories reviewed and updated as indicated.    ALLERGIES:  Patient has no known allergies.       MEDICATIONS:   Current Outpatient Medications:     lidocaine (AKTEN) 3.5 % GEL, Place 20 drops into the left eye daily, Disp: 1 mL, Rfl: 4    metoprolol succinate (TOPROL XL) 25 MG extended release tablet, Take 1 tablet by mouth daily, Disp: , Rfl:     losartan (COZAAR) 100 MG tablet, Take 1 tablet by mouth daily, Disp: , Rfl:     NIFEdipine (ADALAT CC) 60 MG extended release tablet, Take 1 tablet by mouth daily, Disp: , Rfl:     apixaban (ELIQUIS) 2.5 MG TABS tablet, Take 1 tablet by mouth 2 times daily, Disp: 60 tablet, Rfl: 0    REVIEW OF SYSTEMS: Obtained from the patient, chart review and nursing assessment. 10-point ROS reviewed and negative except as per above.    PHYSICAL EXAMINATION:    Vitals:    24 1433   BP: 138/80   Pulse: 88   Resp: 18   Temp: 97.2 °F (36.2 °C)   TempSrc: Temporal   Weight: 58 kg (127 lb 12.8 oz)       Wt Readings from Last 3 Encounters:   24 58 kg (127 lb 12.8 oz)   24 56.7 kg (125 lb)   24 56.7 kg (125 lb)       General: Well developed, no

## 2024-04-16 NOTE — PROGRESS NOTES
Kylah Gomez  4/16/2024  2:38 PM      Vitals:    04/16/24 1433   BP: 138/80   Pulse: 88   Resp: 18   Temp: 97.2 °F (36.2 °C)    :    Wt Readings from Last 3 Encounters:   04/16/24 58 kg (127 lb 12.8 oz)   02/28/24 56.7 kg (125 lb)   02/21/24 56.7 kg (125 lb)                Current Outpatient Medications:     lidocaine (AKTEN) 3.5 % GEL, Place 20 drops into the left eye daily, Disp: 1 mL, Rfl: 4    metoprolol succinate (TOPROL XL) 25 MG extended release tablet, Take 1 tablet by mouth daily, Disp: , Rfl:     losartan (COZAAR) 100 MG tablet, Take 1 tablet by mouth daily, Disp: , Rfl:     NIFEdipine (ADALAT CC) 60 MG extended release tablet, Take 1 tablet by mouth daily, Disp: , Rfl:     apixaban (ELIQUIS) 2.5 MG TABS tablet, Take 1 tablet by mouth 2 times daily, Disp: 60 tablet, Rfl: 0  +    Patient is seen today in follow up for S/P radiation therapy to right lid    Kylah is here today with her daughter for a 6 weeks follow up after completed radiation therapy to right eyelid.  Her skin to RT site is healing really well, denies fatigue.  I encouraged patient to continue to moisturize her skin to RT site, use sunscreen and use a hat when outside in the sun and to make sure not stay in the sun for a long period of time, she voiced understanding.  No other complaints, we will see her back in 6 months.        FALLS RISK SCREENING ASSESSMENT    Instructions:  Assess the patient and enter the appropriate indicators that are present for fall risk identification.   Total the numbers entered and assign a fall risk score from Table 2.  Reassess patient at a minimum every 12 weeks or with status change.    Assessment   Date  4/16/2024     1.  Mental Ability: confusion/cognitively impaired No - 0       2.  Elimination Issues: incontinence, frequency No - 0       3.  Ambulatory: use of assistive devices (walker, cane, off-loading devices), attached to equipment (IV pole, oxygen) No - 0     4.  Sensory Limitations: dizziness,

## 2025-03-17 ENCOUNTER — HOSPITAL ENCOUNTER (OUTPATIENT)
Dept: RADIATION ONCOLOGY | Age: 89
Discharge: HOME | End: 2025-03-17
Payer: COMMERCIAL

## 2025-03-17 VITALS
DIASTOLIC BLOOD PRESSURE: 70 MMHG | TEMPERATURE: 97.5 F | HEART RATE: 96 BPM | SYSTOLIC BLOOD PRESSURE: 137 MMHG | WEIGHT: 126 LBS | RESPIRATION RATE: 18 BRPM | BODY MASS INDEX: 20.97 KG/M2

## 2025-03-17 DIAGNOSIS — C44.91 SKIN CANCER, BASAL CELL: Primary | ICD-10-CM

## 2025-03-17 PROCEDURE — 99215 OFFICE O/P EST HI 40 MIN: CPT | Performed by: RADIOLOGY

## 2025-03-17 PROCEDURE — 99215 OFFICE O/P EST HI 40 MIN: CPT

## 2025-03-17 RX ORDER — ROSUVASTATIN CALCIUM 10 MG/1
10 TABLET, COATED ORAL
COMMUNITY

## 2025-03-17 NOTE — PROGRESS NOTES
Kylah Gomez  7/4/1931 89 y.o.      Referring Physician: Dr Pavon    PCP: Ronn Dias, DO     Vitals:    03/17/25 1428   BP: 137/70   Pulse: 96   Resp: 18   Temp: 97.5 °F (36.4 °C)        Wt Readings from Last 3 Encounters:   03/17/25 57.2 kg (126 lb)   04/16/24 58 kg (127 lb 12.8 oz)   02/28/24 56.7 kg (125 lb)        Body mass index is 20.97 kg/m².               Chief Complaint: No chief complaint on file.         Cancer Staging   Skin cancer, basal cell  Staging form: Cutaneous Squamous Cell Carcinoma Of The Head And Neck, AJCC 8th Edition  - Clinical stage from 12/14/2023: Stage I (cT1, cN0, cM0) - Signed by Madison Duarte MD on 12/14/2023      Prior Radiation Therapy? YES: Site Treated: Right eyelid          Facility: [facility]          Date: 01/22/24 to 3/08/24    Concurrent Chemo/radiation? NO    Prior Chemotherapy? NO    Prior Hormonal Therapy? NO    Head and Neck Cancer? No, patient does NOT have HN cancer.        Current Outpatient Medications   Medication Sig Dispense Refill    rosuvastatin (CRESTOR) 10 MG tablet Take 1 tablet by mouth 2 times a week      lidocaine (AKTEN) 3.5 % GEL Place 20 drops into the left eye daily 1 mL 4    metoprolol succinate (TOPROL XL) 25 MG extended release tablet Take 1 tablet by mouth daily      losartan (COZAAR) 100 MG tablet Take 1 tablet by mouth daily      NIFEdipine (ADALAT CC) 60 MG extended release tablet Take 1 tablet by mouth daily      apixaban (ELIQUIS) 2.5 MG TABS tablet Take 1 tablet by mouth 2 times daily 60 tablet 0     No current facility-administered medications for this encounter.       Past Medical History:   Diagnosis Date    A-fib (HCC)     Acute CVA (cerebrovascular accident) (HCC) 07/07/2023    Cancer (HCC) 11/27/2023    Skin    Hyperlipidemia     Hypertension     Melanoma (HCC)     Left thigh, 30 some years ago       Past Surgical History:   Procedure Laterality Date    COLONOSCOPY      yrs ago       Family History   Problem

## 2025-03-17 NOTE — PROGRESS NOTES
Radiation Oncology Consult Note         3/17/2025    Kylah Gomez  89 y.o.   7/4/1931    REFERRING PROVIDER: Savanah    PCP:  Ronn Dias DO    CHIEF COMPLAINT: I have another skin cancer on my face.    DIAGNOSIS: Basalf cell skin cancer of the right forehead    STAGING:  Cancer Staging   Skin cancer, basal cell  Staging form: Cutaneous Squamous Cell Carcinoma Of The Head And Neck, AJCC 8th Edition  - Clinical stage from 12/14/2023: Stage I (cT1, cN0, cM0) - Signed by Madison Duarte MD on 12/14/2023      HISTORY OF PRESENT ILLNESS: Ms. Kylah Gomez  is a 89 y.o. year old female already known to me since I have treated her back in January 2024 till March 2024 for a basal cell cancer of the right lower eyelid.  At that time she received a dose of 66 Gray in 33 fractions.  Today the patient comes back referred again by Dr. Pavon for another basal cell carcinoma, ulcerated nodular and infiltrating at the level of the right superior forehead.  She had a biopsy done on 3/7/2025.  Today the lesion measures about 1 cm in maximum diameter, margins are slightly irregular with minimal erythema surrounding them.  Of note she had a complete healing of the basal cell carcinoma of the right lower eyelid, where the only side effects are related to lack of eyelashes and minimal dryness of the skin.  She has very good vision from that eye.  She is otherwise doing very well with minimal comorbidities.    PAST MEDICAL HISTORY:      Diagnosis Date    A-fib (HCC)     Acute CVA (cerebrovascular accident) (HCC) 07/07/2023    Cancer (HCC) 11/27/2023    Skin    Hyperlipidemia     Hypertension     Melanoma (HCC)     Left thigh, 30 some years ago       PAST SURGICAL HISTORY:      Procedure Laterality Date    COLONOSCOPY      yrs ago       No Known Allergies    MEDICATIONS:  Medications reviewed and reconciled.  Current Outpatient Medications   Medication Sig Dispense Refill    rosuvastatin (CRESTOR) 10 MG tablet Take

## 2025-03-18 NOTE — ADDENDUM NOTE
Encounter addended by: Farhat Isabel RN on: 3/18/2025 12:41 PM   Actions taken: Clinical Note Signed

## 2025-03-26 ENCOUNTER — HOSPITAL ENCOUNTER (OUTPATIENT)
Dept: RADIATION ONCOLOGY | Age: 89
Discharge: HOME | End: 2025-03-26
Payer: COMMERCIAL

## 2025-03-26 ENCOUNTER — HOSPITAL ENCOUNTER (OUTPATIENT)
Dept: CT IMAGING | Age: 89
Discharge: HOME | End: 2025-03-28

## 2025-03-26 PROCEDURE — 77290 THER RAD SIMULAJ FIELD CPLX: CPT | Performed by: RADIOLOGY

## 2025-04-02 ENCOUNTER — HOSPITAL ENCOUNTER (OUTPATIENT)
Dept: RADIATION ONCOLOGY | Age: 89
Discharge: HOME | End: 2025-04-02
Payer: COMMERCIAL

## 2025-04-02 PROCEDURE — 77290 THER RAD SIMULAJ FIELD CPLX: CPT | Performed by: RADIOLOGY

## 2025-04-03 ENCOUNTER — HOSPITAL ENCOUNTER (OUTPATIENT)
Dept: RADIATION ONCOLOGY | Age: 89
Discharge: HOME | End: 2025-04-03
Payer: COMMERCIAL

## 2025-04-03 PROCEDURE — 77300 RADIATION THERAPY DOSE PLAN: CPT | Performed by: RADIOLOGY

## 2025-04-03 PROCEDURE — 77332 RADIATION TREATMENT AID(S): CPT | Performed by: RADIOLOGY

## 2025-04-15 ENCOUNTER — HOSPITAL ENCOUNTER (OUTPATIENT)
Dept: RADIATION ONCOLOGY | Age: 89
Discharge: HOME | End: 2025-04-15
Payer: COMMERCIAL

## 2025-04-15 PROCEDURE — 77412 RADIATION TX DELIVERY LVL 3: CPT | Performed by: RADIOLOGY

## 2025-04-16 ENCOUNTER — HOSPITAL ENCOUNTER (OUTPATIENT)
Dept: RADIATION ONCOLOGY | Age: 89
Discharge: HOME | End: 2025-04-16
Payer: COMMERCIAL

## 2025-04-16 VITALS
SYSTOLIC BLOOD PRESSURE: 146 MMHG | DIASTOLIC BLOOD PRESSURE: 72 MMHG | TEMPERATURE: 97.9 F | HEART RATE: 93 BPM | BODY MASS INDEX: 20.87 KG/M2 | WEIGHT: 125.4 LBS | RESPIRATION RATE: 18 BRPM

## 2025-04-16 DIAGNOSIS — C44.91 SKIN CANCER, BASAL CELL: Primary | ICD-10-CM

## 2025-04-16 PROCEDURE — 77412 RADIATION TX DELIVERY LVL 3: CPT | Performed by: RADIOLOGY

## 2025-04-16 PROCEDURE — 77331 SPECIAL RADIATION DOSIMETRY: CPT | Performed by: RADIOLOGY

## 2025-04-16 NOTE — PROGRESS NOTES
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      Past medical, surgical, social and family histories reviewed and updated as indicated.    PAIN: No    ALLERGIES:  Patient has no known allergies.         Current Outpatient Medications   Medication Sig Dispense Refill    rosuvastatin (CRESTOR) 10 MG tablet Take 1 tablet by mouth 2 times a week      lidocaine (AKTEN) 3.5 % GEL Place 20 drops into the left eye daily 1 mL 4    metoprolol succinate (TOPROL XL) 25 MG extended release tablet Take 1 tablet by mouth daily      losartan (COZAAR) 100 MG tablet Take 1 tablet by mouth daily      NIFEdipine (ADALAT CC) 60 MG extended release tablet Take 1 tablet by mouth daily      apixaban (ELIQUIS) 2.5 MG TABS tablet Take 1 tablet by mouth 2 times daily 60 tablet 0     No current facility-administered medications for this encounter.         OBJECTIVE:  Alert and fully ambulatory. Pleasant and conversant.      Physical Examination:General appearance - alert, well appearing, and in no distress and normal appearing weight:  Constitutional: A well developed, well nourished 89 y.o. female who is alert, oriented, cooperative and in no apparent distress.  HEENT:   Skin:  Warm and dry.  No obvious rashes.    Vitals:    04/16/25 1331   BP: (!) 146/72   Pulse: 93   Resp: 18   Temp: 97.9 °F (36.6 °C)   TempSrc: Temporal   Weight: 56.9 kg (125 lb 6.4 oz)       Wt Readings from Last 3 Encounters:   04/16/25 56.9 kg (125 lb 6.4 oz)   03/17/25 57.2 kg (126 lb)   04/16/24 58 kg (127 lb 12.8 oz)       ASSESSMENT/PLAN:     Patient is tolerating treatments well with expected toxicities.    Current and planned dose reviewed. Goals of treatment and potential side

## 2025-04-16 NOTE — PROGRESS NOTES
Kylah Gomez  4/16/2025  Wt Readings from Last 3 Encounters:   04/16/25 56.9 kg (125 lb 6.4 oz)   03/17/25 57.2 kg (126 lb)   04/16/24 58 kg (127 lb 12.8 oz)     Body mass index is 20.87 kg/m².      Treatment Area:Right sup forehead    Patient was seen today for weekly visit.     Comfort Alteration  KPS:70%  Fatigue: None        Nutritional Alteration  Anorexia: No   Nausea: No   Vomiting: No    Skin Alteration   Sensation:none    Radiation Dermatitis:  none    Mucous Membrane Alteration  Drainage: No  Drainage Odor: No    Emotional  Coping: effective      Injury, potential bleeding or infection: na      Other:na    Lab Results   Component Value Date    WBC 6.4 07/10/2023    HGB 12.6 07/10/2023    HCT 39.7 07/10/2023     07/10/2023         BP (!) 146/72   Pulse 93   Temp 97.9 °F (36.6 °C) (Temporal)   Resp 18   Wt 56.9 kg (125 lb 6.4 oz)   BMI 20.87 kg/m²   BP within normal range? yes     Assessment/Plan: Completed 2/35 fractions; 400/7000 cGy    Farhat Isabel RN

## 2025-04-17 ENCOUNTER — HOSPITAL ENCOUNTER (OUTPATIENT)
Dept: RADIATION ONCOLOGY | Age: 89
Discharge: HOME | End: 2025-04-17
Payer: COMMERCIAL

## 2025-04-17 PROCEDURE — 77412 RADIATION TX DELIVERY LVL 3: CPT | Performed by: RADIOLOGY

## 2025-04-18 ENCOUNTER — HOSPITAL ENCOUNTER (OUTPATIENT)
Dept: RADIATION ONCOLOGY | Age: 89
Discharge: HOME | End: 2025-04-18
Payer: COMMERCIAL

## 2025-04-18 PROCEDURE — 77412 RADIATION TX DELIVERY LVL 3: CPT | Performed by: RADIOLOGY

## 2025-04-21 ENCOUNTER — HOSPITAL ENCOUNTER (OUTPATIENT)
Dept: RADIATION ONCOLOGY | Age: 89
Discharge: HOME | End: 2025-04-21
Payer: COMMERCIAL

## 2025-04-21 PROCEDURE — 77336 RADIATION PHYSICS CONSULT: CPT | Performed by: RADIOLOGY

## 2025-04-21 PROCEDURE — 77412 RADIATION TX DELIVERY LVL 3: CPT | Performed by: RADIOLOGY

## 2025-04-22 ENCOUNTER — HOSPITAL ENCOUNTER (OUTPATIENT)
Dept: RADIATION ONCOLOGY | Age: 89
Discharge: HOME | End: 2025-04-22
Payer: COMMERCIAL

## 2025-04-22 PROCEDURE — 77412 RADIATION TX DELIVERY LVL 3: CPT | Performed by: RADIOLOGY

## 2025-04-23 ENCOUNTER — HOSPITAL ENCOUNTER (OUTPATIENT)
Dept: RADIATION ONCOLOGY | Age: 89
Discharge: HOME | End: 2025-04-23
Payer: COMMERCIAL

## 2025-04-23 VITALS
TEMPERATURE: 97.5 F | HEART RATE: 86 BPM | DIASTOLIC BLOOD PRESSURE: 82 MMHG | WEIGHT: 125 LBS | SYSTOLIC BLOOD PRESSURE: 148 MMHG | RESPIRATION RATE: 18 BRPM | BODY MASS INDEX: 20.8 KG/M2

## 2025-04-23 DIAGNOSIS — C44.91 SKIN CANCER, BASAL CELL: Primary | ICD-10-CM

## 2025-04-23 PROCEDURE — 77412 RADIATION TX DELIVERY LVL 3: CPT | Performed by: RADIOLOGY

## 2025-04-23 NOTE — PROGRESS NOTES
Kylah Gomez  4/23/2025  Wt Readings from Last 3 Encounters:   04/23/25 56.7 kg (125 lb)   04/16/25 56.9 kg (125 lb 6.4 oz)   03/17/25 57.2 kg (126 lb)     Body mass index is 20.8 kg/m².      Treatment Area:Right sup forehead    Patient was seen today for weekly visit.     Comfort Alteration  KPS:70%  Fatigue: None        Nutritional Alteration  Anorexia: No   Nausea: No   Vomiting: No    Skin Alteration   Sensation:no issues    Radiation Dermatitis:  none    Mucous Membrane Alteration  Drainage: No  Drainage Odor: No    Emotional  Coping: effective      Injury, potential bleeding or infection: na      Other:na    Lab Results   Component Value Date    WBC 6.4 07/10/2023    HGB 12.6 07/10/2023    HCT 39.7 07/10/2023     07/10/2023         BP (!) 148/82   Pulse 86   Temp 97.5 °F (36.4 °C) (Temporal)   Resp 18   Wt 56.7 kg (125 lb)   BMI 20.80 kg/m²   BP within normal range? yes       Assessment/Plan:Completed 7/35 fractions; 1400/7000 cGy    Farhat Isabel RN

## 2025-04-23 NOTE — PROGRESS NOTES
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      Past medical, surgical, social and family histories reviewed and updated as indicated.    PAIN: No    ALLERGIES:  Patient has no known allergies.         Current Outpatient Medications   Medication Sig Dispense Refill    rosuvastatin (CRESTOR) 10 MG tablet Take 1 tablet by mouth 2 times a week      lidocaine (AKTEN) 3.5 % GEL Place 20 drops into the left eye daily 1 mL 4    metoprolol succinate (TOPROL XL) 25 MG extended release tablet Take 1 tablet by mouth daily      losartan (COZAAR) 100 MG tablet Take 1 tablet by mouth daily      NIFEdipine (ADALAT CC) 60 MG extended release tablet Take 1 tablet by mouth daily      apixaban (ELIQUIS) 2.5 MG TABS tablet Take 1 tablet by mouth 2 times daily 60 tablet 0     No current facility-administered medications for this encounter.         OBJECTIVE:  Alert and fully ambulatory. Pleasant and conversant.      Physical Examination:General appearance - alert, well appearing, and in no distress and normal appearing weight:  Constitutional: A well developed, well nourished 89 y.o. female who is alert, oriented, cooperative and in no apparent distress.  HEENT:   Skin:  Warm and dry.  No obvious rashes.    Vitals:    04/23/25 1337   BP: (!) 148/82   Pulse: 86   Resp: 18   Temp: 97.5 °F (36.4 °C)   TempSrc: Temporal   Weight: 56.7 kg (125 lb)       Wt Readings from Last 3 Encounters:   04/23/25 56.7 kg (125 lb)   04/16/25 56.9 kg (125 lb 6.4 oz)   03/17/25 57.2 kg (126 lb)       ASSESSMENT/PLAN:     Patient is tolerating treatments well with expected toxicities.    Current and planned dose reviewed. Goals of treatment and potential

## 2025-04-24 ENCOUNTER — HOSPITAL ENCOUNTER (OUTPATIENT)
Dept: RADIATION ONCOLOGY | Age: 89
Discharge: HOME | End: 2025-04-24
Payer: COMMERCIAL

## 2025-04-24 PROCEDURE — 77412 RADIATION TX DELIVERY LVL 3: CPT | Performed by: RADIOLOGY

## 2025-04-25 ENCOUNTER — HOSPITAL ENCOUNTER (OUTPATIENT)
Dept: RADIATION ONCOLOGY | Age: 89
Discharge: HOME | End: 2025-04-25
Payer: COMMERCIAL

## 2025-04-25 PROCEDURE — 77412 RADIATION TX DELIVERY LVL 3: CPT | Performed by: RADIOLOGY

## 2025-04-28 ENCOUNTER — HOSPITAL ENCOUNTER (OUTPATIENT)
Dept: RADIATION ONCOLOGY | Age: 89
Discharge: HOME | End: 2025-04-28
Payer: COMMERCIAL

## 2025-04-28 PROCEDURE — 77412 RADIATION TX DELIVERY LVL 3: CPT | Performed by: RADIOLOGY

## 2025-04-28 PROCEDURE — 77336 RADIATION PHYSICS CONSULT: CPT | Performed by: RADIOLOGY

## 2025-04-28 PROCEDURE — 77427 RADIATION TX MANAGEMENT X5: CPT | Performed by: RADIOLOGY

## 2025-04-29 ENCOUNTER — HOSPITAL ENCOUNTER (OUTPATIENT)
Dept: RADIATION ONCOLOGY | Age: 89
Discharge: HOME | End: 2025-04-29
Payer: COMMERCIAL

## 2025-04-29 PROCEDURE — 77412 RADIATION TX DELIVERY LVL 3: CPT | Performed by: RADIOLOGY

## 2025-04-30 ENCOUNTER — HOSPITAL ENCOUNTER (OUTPATIENT)
Dept: RADIATION ONCOLOGY | Age: 89
Discharge: HOME | End: 2025-04-30
Payer: COMMERCIAL

## 2025-04-30 VITALS
DIASTOLIC BLOOD PRESSURE: 80 MMHG | BODY MASS INDEX: 20.83 KG/M2 | SYSTOLIC BLOOD PRESSURE: 141 MMHG | HEART RATE: 71 BPM | TEMPERATURE: 97.2 F | RESPIRATION RATE: 18 BRPM | WEIGHT: 125.2 LBS

## 2025-04-30 DIAGNOSIS — C44.91 SKIN CANCER, BASAL CELL: Primary | ICD-10-CM

## 2025-04-30 PROCEDURE — 77412 RADIATION TX DELIVERY LVL 3: CPT | Performed by: RADIOLOGY

## 2025-04-30 NOTE — PROGRESS NOTES
Kylah Gomez  4/30/2025  Wt Readings from Last 3 Encounters:   04/30/25 56.8 kg (125 lb 3.2 oz)   04/23/25 56.7 kg (125 lb)   04/16/25 56.9 kg (125 lb 6.4 oz)     Body mass index is 20.83 kg/m².      Treatment Area:right sup forehead    Patient was seen today for weekly visit.     Comfort Alteration  KPS:70%  Fatigue: None        Nutritional Alteration  Anorexia: No   Nausea: No   Vomiting: No    Skin Alteration   Sensation:none    Radiation Dermatitis:  none    Mucous Membrane Alteration  Drainage: No  Drainage Odor: No    Emotional  Coping: effective      Injury, potential bleeding or infection: na      Other:na    Lab Results   Component Value Date    WBC 6.4 07/10/2023    HGB 12.6 07/10/2023    HCT 39.7 07/10/2023     07/10/2023         BP (!) 141/80   Pulse 71   Temp 97.2 °F (36.2 °C) (Temporal)   Resp 18   Wt 56.8 kg (125 lb 3.2 oz)   BMI 20.83 kg/m²   BP within normal range? yes       Assessment/Plan:Completed 12/35 fractions; 2400/7000 cGy    Farhat Isabel RN

## 2025-04-30 NOTE — PROGRESS NOTES
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      Past medical, surgical, social and family histories reviewed and updated as indicated.    PAIN: No    ALLERGIES:  Patient has no known allergies.         Current Outpatient Medications   Medication Sig Dispense Refill    rosuvastatin (CRESTOR) 10 MG tablet Take 1 tablet by mouth 2 times a week      lidocaine (AKTEN) 3.5 % GEL Place 20 drops into the left eye daily 1 mL 4    metoprolol succinate (TOPROL XL) 25 MG extended release tablet Take 1 tablet by mouth daily      losartan (COZAAR) 100 MG tablet Take 1 tablet by mouth daily      NIFEdipine (ADALAT CC) 60 MG extended release tablet Take 1 tablet by mouth daily      apixaban (ELIQUIS) 2.5 MG TABS tablet Take 1 tablet by mouth 2 times daily 60 tablet 0     No current facility-administered medications for this encounter.         OBJECTIVE:  Alert and fully ambulatory. Pleasant and conversant.      Physical Examination:General appearance - alert, well appearing, and in no distress and normal appearing weight:  Constitutional: A well developed, well nourished 89 y.o. female who is alert, oriented, cooperative and in no apparent distress.  HEENT:   Skin:  Warm and dry.  No obvious rashes.    Vitals:    04/30/25 1330   BP: (!) 141/80   Pulse: 71   Resp: 18   Temp: 97.2 °F (36.2 °C)   TempSrc: Temporal   Weight: 56.8 kg (125 lb 3.2 oz)       Wt Readings from Last 3 Encounters:   04/30/25 56.8 kg (125 lb 3.2 oz)   04/23/25 56.7 kg (125 lb)   04/16/25 56.9 kg (125 lb 6.4 oz)       ASSESSMENT/PLAN:     Patient is tolerating treatments well with expected toxicities.    Current and planned dose reviewed. Goals of treatment and potential side

## 2025-05-01 ENCOUNTER — HOSPITAL ENCOUNTER (OUTPATIENT)
Dept: RADIATION ONCOLOGY | Age: 89
Discharge: HOME | End: 2025-05-01
Payer: COMMERCIAL

## 2025-05-01 PROCEDURE — 77412 RADIATION TX DELIVERY LVL 3: CPT | Performed by: RADIOLOGY

## 2025-05-02 ENCOUNTER — HOSPITAL ENCOUNTER (OUTPATIENT)
Dept: RADIATION ONCOLOGY | Age: 89
Discharge: HOME | End: 2025-05-02
Payer: COMMERCIAL

## 2025-05-02 PROCEDURE — 77412 RADIATION TX DELIVERY LVL 3: CPT | Performed by: RADIOLOGY

## 2025-05-05 ENCOUNTER — HOSPITAL ENCOUNTER (OUTPATIENT)
Dept: RADIATION ONCOLOGY | Age: 89
Discharge: HOME | End: 2025-05-05
Payer: COMMERCIAL

## 2025-05-06 ENCOUNTER — HOSPITAL ENCOUNTER (OUTPATIENT)
Dept: RADIATION ONCOLOGY | Age: 89
Discharge: HOME | End: 2025-05-06
Payer: COMMERCIAL

## 2025-05-06 PROCEDURE — 77412 RADIATION TX DELIVERY LVL 3: CPT | Performed by: RADIOLOGY

## 2025-05-07 ENCOUNTER — APPOINTMENT (OUTPATIENT)
Dept: RADIATION ONCOLOGY | Age: 89
End: 2025-05-07
Payer: COMMERCIAL

## 2025-05-07 ENCOUNTER — HOSPITAL ENCOUNTER (OUTPATIENT)
Dept: RADIATION ONCOLOGY | Age: 89
End: 2025-05-07
Payer: COMMERCIAL

## 2025-05-08 ENCOUNTER — HOSPITAL ENCOUNTER (OUTPATIENT)
Dept: RADIATION ONCOLOGY | Age: 89
Discharge: HOME | End: 2025-05-08
Payer: COMMERCIAL

## 2025-05-08 VITALS
SYSTOLIC BLOOD PRESSURE: 123 MMHG | HEART RATE: 90 BPM | DIASTOLIC BLOOD PRESSURE: 74 MMHG | RESPIRATION RATE: 18 BRPM | TEMPERATURE: 97.8 F

## 2025-05-08 DIAGNOSIS — C44.91 SKIN CANCER, BASAL CELL: Primary | ICD-10-CM

## 2025-05-08 PROCEDURE — 77412 RADIATION TX DELIVERY LVL 3: CPT | Performed by: RADIOLOGY

## 2025-05-08 NOTE — PROGRESS NOTES
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      Past medical, surgical, social and family histories reviewed and updated as indicated.    PAIN: No    ALLERGIES:  Patient has no known allergies.         Current Outpatient Medications   Medication Sig Dispense Refill    rosuvastatin (CRESTOR) 10 MG tablet Take 1 tablet by mouth 2 times a week      lidocaine (AKTEN) 3.5 % GEL Place 20 drops into the left eye daily 1 mL 4    metoprolol succinate (TOPROL XL) 25 MG extended release tablet Take 1 tablet by mouth daily      losartan (COZAAR) 100 MG tablet Take 1 tablet by mouth daily      NIFEdipine (ADALAT CC) 60 MG extended release tablet Take 1 tablet by mouth daily      apixaban (ELIQUIS) 2.5 MG TABS tablet Take 1 tablet by mouth 2 times daily 60 tablet 0     No current facility-administered medications for this encounter.         OBJECTIVE:  Alert and fully ambulatory. Pleasant and conversant.      Physical Examination:General appearance - alert, well appearing, and in no distress and normal appearing weight:  Constitutional: A well developed, well nourished 89 y.o. female who is alert, oriented, cooperative and in no apparent distress.  HEENT:   Skin:  Warm and dry.  No obvious rashes.    Vitals:    05/08/25 1336   BP: 123/74   Pulse: 90   Resp: 18   Temp: 97.8 °F (36.6 °C)   TempSrc: Temporal       Wt Readings from Last 3 Encounters:   04/30/25 56.8 kg (125 lb 3.2 oz)   04/23/25 56.7 kg (125 lb)   04/16/25 56.9 kg (125 lb 6.4 oz)       ASSESSMENT/PLAN:     Patient is tolerating treatments well with expected toxicities.    Current and planned dose reviewed. Goals of treatment and potential side effects were reviewed with the

## 2025-05-08 NOTE — PROGRESS NOTES
Kylah Gomez  5/8/2025  Wt Readings from Last 3 Encounters:   04/30/25 56.8 kg (125 lb 3.2 oz)   04/23/25 56.7 kg (125 lb)   04/16/25 56.9 kg (125 lb 6.4 oz)     There is no height or weight on file to calculate BMI.      Treatment Area:right sup forehead    Patient was seen today for weekly visit.     Comfort Alteration  KPS:70%  Fatigue: Mild        Nutritional Alteration  Anorexia: No   Nausea: No   Vomiting: No    Skin Alteration   Sensation:no issues    Radiation Dermatitis:  none    Mucous Membrane Alteration  Drainage: No  Drainage Odor: No    Emotional  Coping: effective      Injury, potential bleeding or infection: na      Other:na    Lab Results   Component Value Date    WBC 6.4 07/10/2023    HGB 12.6 07/10/2023    HCT 39.7 07/10/2023     07/10/2023         /74   Pulse 90   Temp 97.8 °F (36.6 °C) (Temporal)   Resp 18   BP within normal range? yes       Assessment/Plan:Completed 1735 fractions; 3400/7000 cGy    Farhat Isabel RN

## 2025-05-09 ENCOUNTER — HOSPITAL ENCOUNTER (OUTPATIENT)
Dept: RADIATION ONCOLOGY | Age: 89
Discharge: HOME | End: 2025-05-09
Payer: COMMERCIAL

## 2025-05-12 ENCOUNTER — HOSPITAL ENCOUNTER (OUTPATIENT)
Dept: RADIATION ONCOLOGY | Age: 89
Discharge: HOME | End: 2025-05-12
Payer: COMMERCIAL

## 2025-05-13 ENCOUNTER — HOSPITAL ENCOUNTER (OUTPATIENT)
Dept: RADIATION ONCOLOGY | Age: 89
Discharge: HOME | End: 2025-05-13
Payer: COMMERCIAL

## 2025-05-13 PROCEDURE — 77336 RADIATION PHYSICS CONSULT: CPT | Performed by: RADIOLOGY

## 2025-05-13 PROCEDURE — 77412 RADIATION TX DELIVERY LVL 3: CPT | Performed by: RADIOLOGY

## 2025-05-14 ENCOUNTER — HOSPITAL ENCOUNTER (OUTPATIENT)
Dept: RADIATION ONCOLOGY | Age: 89
Discharge: HOME | End: 2025-05-14
Payer: COMMERCIAL

## 2025-05-14 VITALS — TEMPERATURE: 97.2 F

## 2025-05-14 DIAGNOSIS — C44.91 SKIN CANCER, BASAL CELL: Primary | ICD-10-CM

## 2025-05-14 PROCEDURE — 77412 RADIATION TX DELIVERY LVL 3: CPT | Performed by: RADIOLOGY

## 2025-05-14 NOTE — PROGRESS NOTES
Kylah Gomez  5/14/2025  Wt Readings from Last 3 Encounters:   04/30/25 56.8 kg (125 lb 3.2 oz)   04/23/25 56.7 kg (125 lb)   04/16/25 56.9 kg (125 lb 6.4 oz)     There is no height or weight on file to calculate BMI.      Treatment Area:right sup forehead    Patient was seen today for weekly visit.     Comfort Alteration  KPS:70%  Fatigue: Mild        Nutritional Alteration  Anorexia: No   Nausea: No   Vomiting: No    Skin Alteration   Sensation:none    Radiation Dermatitis:  none    Mucous Membrane Alteration  Drainage: No  Drainage Odor: No    Emotional  Coping: effective      Injury, potential bleeding or infection: none      Other:none    Lab Results   Component Value Date    WBC 6.4 07/10/2023    HGB 12.6 07/10/2023    HCT 39.7 07/10/2023     07/10/2023         Temp 97.2 °F (36.2 °C)   BP within normal range? yes       Assessment/Plan:Completed 21/35 Fx; 4200/7000 cGy    Farhat Isabel RN

## 2025-05-14 NOTE — PROGRESS NOTES
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      Past medical, surgical, social and family histories reviewed and updated as indicated.    PAIN: No    ALLERGIES:  Patient has no known allergies.         Current Outpatient Medications   Medication Sig Dispense Refill    rosuvastatin (CRESTOR) 10 MG tablet Take 1 tablet by mouth 2 times a week      lidocaine (AKTEN) 3.5 % GEL Place 20 drops into the left eye daily 1 mL 4    metoprolol succinate (TOPROL XL) 25 MG extended release tablet Take 1 tablet by mouth daily      losartan (COZAAR) 100 MG tablet Take 1 tablet by mouth daily      NIFEdipine (ADALAT CC) 60 MG extended release tablet Take 1 tablet by mouth daily      apixaban (ELIQUIS) 2.5 MG TABS tablet Take 1 tablet by mouth 2 times daily 60 tablet 0     No current facility-administered medications for this encounter.         OBJECTIVE:  Alert and fully ambulatory. Pleasant and conversant.      Physical Examination:General appearance - alert, well appearing, and in no distress and normal appearing weight:  Constitutional: A well developed, well nourished 89 y.o. female who is alert, oriented, cooperative and in no apparent distress.  HEENT:   Skin:  Warm and dry.  No obvious rashes.    Vitals:    05/14/25 1411   Temp: 97.2 °F (36.2 °C)       Wt Readings from Last 3 Encounters:   04/30/25 56.8 kg (125 lb 3.2 oz)   04/23/25 56.7 kg (125 lb)   04/16/25 56.9 kg (125 lb 6.4 oz)       ASSESSMENT/PLAN:     Patient is tolerating treatments well with expected toxicities.    Current and planned dose reviewed. Goals of treatment and potential side effects were reviewed with the patient. Treatment imaging has been personally reviewed for

## 2025-05-15 ENCOUNTER — HOSPITAL ENCOUNTER (OUTPATIENT)
Dept: RADIATION ONCOLOGY | Age: 89
Discharge: HOME | End: 2025-05-15
Payer: COMMERCIAL

## 2025-05-15 PROCEDURE — 77412 RADIATION TX DELIVERY LVL 3: CPT | Performed by: RADIOLOGY

## 2025-05-16 ENCOUNTER — HOSPITAL ENCOUNTER (OUTPATIENT)
Dept: RADIATION ONCOLOGY | Age: 89
Discharge: HOME | End: 2025-05-16
Payer: COMMERCIAL

## 2025-05-16 PROCEDURE — 77412 RADIATION TX DELIVERY LVL 3: CPT | Performed by: RADIOLOGY

## 2025-05-19 ENCOUNTER — HOSPITAL ENCOUNTER (OUTPATIENT)
Dept: RADIATION ONCOLOGY | Age: 89
Discharge: HOME | End: 2025-05-19
Payer: COMMERCIAL

## 2025-05-19 PROCEDURE — 77412 RADIATION TX DELIVERY LVL 3: CPT | Performed by: RADIOLOGY

## 2025-05-20 ENCOUNTER — HOSPITAL ENCOUNTER (OUTPATIENT)
Dept: RADIATION ONCOLOGY | Age: 89
Discharge: HOME | End: 2025-05-20
Payer: COMMERCIAL

## 2025-05-20 PROCEDURE — 77336 RADIATION PHYSICS CONSULT: CPT | Performed by: RADIOLOGY

## 2025-05-20 PROCEDURE — 77412 RADIATION TX DELIVERY LVL 3: CPT | Performed by: RADIOLOGY

## 2025-05-21 ENCOUNTER — HOSPITAL ENCOUNTER (OUTPATIENT)
Dept: RADIATION ONCOLOGY | Age: 89
Discharge: HOME | End: 2025-05-21
Payer: COMMERCIAL

## 2025-05-21 VITALS
TEMPERATURE: 97.9 F | RESPIRATION RATE: 18 BRPM | BODY MASS INDEX: 21.23 KG/M2 | DIASTOLIC BLOOD PRESSURE: 76 MMHG | SYSTOLIC BLOOD PRESSURE: 126 MMHG | HEART RATE: 77 BPM | WEIGHT: 127.6 LBS

## 2025-05-21 DIAGNOSIS — C44.91 SKIN CANCER, BASAL CELL: Primary | ICD-10-CM

## 2025-05-21 PROCEDURE — 77412 RADIATION TX DELIVERY LVL 3: CPT | Performed by: RADIOLOGY

## 2025-05-21 NOTE — PROGRESS NOTES
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  The cancer is responding well to the treatment.      Past medical, surgical, social and family histories reviewed and updated as indicated.    PAIN: No    ALLERGIES:  Patient has no known allergies.         Current Outpatient Medications   Medication Sig Dispense Refill    rosuvastatin (CRESTOR) 10 MG tablet Take 1 tablet by mouth 2 times a week      lidocaine (AKTEN) 3.5 % GEL Place 20 drops into the left eye daily 1 mL 4    metoprolol succinate (TOPROL XL) 25 MG extended release tablet Take 1 tablet by mouth daily      losartan (COZAAR) 100 MG tablet Take 1 tablet by mouth daily      NIFEdipine (ADALAT CC) 60 MG extended release tablet Take 1 tablet by mouth daily      apixaban (ELIQUIS) 2.5 MG TABS tablet Take 1 tablet by mouth 2 times daily 60 tablet 0     No current facility-administered medications for this encounter.         OBJECTIVE:  Alert and fully ambulatory. Pleasant and conversant.      Physical Examination:General appearance - alert, well appearing, and in no distress and normal appearing weight:  Constitutional: A well developed, well nourished 89 y.o. female who is alert, oriented, cooperative and in no apparent distress.  HEENT:   Skin:  Warm and dry.  No obvious rashes.    Vitals:    05/21/25 1353   BP: 126/76   Pulse: 77   Resp: 18   Temp: 97.9 °F (36.6 °C)   TempSrc: Temporal   Weight: 57.9 kg (127 lb 9.6 oz)       Wt Readings from Last 3 Encounters:   05/21/25 57.9 kg (127 lb 9.6 oz)   04/30/25 56.8 kg (125 lb 3.2 oz)   04/23/25 56.7 kg (125 lb)       ASSESSMENT/PLAN:     Patient is tolerating treatments well with expected toxicities.    Current and planned dose

## 2025-05-21 NOTE — PROGRESS NOTES
Kylah Gomez  5/21/2025  Wt Readings from Last 3 Encounters:   05/21/25 57.9 kg (127 lb 9.6 oz)   04/30/25 56.8 kg (125 lb 3.2 oz)   04/23/25 56.7 kg (125 lb)     Body mass index is 21.23 kg/m².      Treatment Area:right sup forehead    Patient was seen today for weekly visit.     Comfort Alteration  KPS:70%  Fatigue: Mild        Nutritional Alteration  Anorexia: No   Nausea: No   Vomiting: No    Skin Alteration   Sensation:no itching    Radiation Dermatitis:  small red area    Mucous Membrane Alteration  Drainage: No  Drainage Odor: No    Emotional  Coping: effective      Injury, potential bleeding or infection: na      Other:na    Lab Results   Component Value Date    WBC 6.4 07/10/2023    HGB 12.6 07/10/2023    HCT 39.7 07/10/2023     07/10/2023         /76   Pulse 77   Temp 97.9 °F (36.6 °C) (Temporal)   Resp 18   Wt 57.9 kg (127 lb 9.6 oz)   BMI 21.23 kg/m²   BP within normal range? yes       Assessment/Plan:Completed 26/35 fractions 5200/7000 cGy    Farhat Isabel RN

## 2025-05-22 ENCOUNTER — HOSPITAL ENCOUNTER (OUTPATIENT)
Dept: RADIATION ONCOLOGY | Age: 89
Discharge: HOME | End: 2025-05-22
Payer: COMMERCIAL

## 2025-05-22 PROCEDURE — 77412 RADIATION TX DELIVERY LVL 3: CPT | Performed by: RADIOLOGY

## 2025-05-23 ENCOUNTER — HOSPITAL ENCOUNTER (OUTPATIENT)
Dept: RADIATION ONCOLOGY | Age: 89
Discharge: HOME | End: 2025-05-23
Payer: COMMERCIAL

## 2025-05-23 PROCEDURE — 77412 RADIATION TX DELIVERY LVL 3: CPT | Performed by: RADIOLOGY

## 2025-05-27 ENCOUNTER — HOSPITAL ENCOUNTER (OUTPATIENT)
Dept: RADIATION ONCOLOGY | Age: 89
Discharge: HOME | End: 2025-05-27
Payer: COMMERCIAL

## 2025-05-27 PROCEDURE — 77412 RADIATION TX DELIVERY LVL 3: CPT | Performed by: RADIOLOGY

## 2025-05-28 ENCOUNTER — HOSPITAL ENCOUNTER (OUTPATIENT)
Dept: RADIATION ONCOLOGY | Age: 89
Discharge: HOME | End: 2025-05-28
Payer: COMMERCIAL

## 2025-05-28 VITALS
TEMPERATURE: 97.4 F | RESPIRATION RATE: 18 BRPM | OXYGEN SATURATION: 94 % | WEIGHT: 128.8 LBS | HEART RATE: 79 BPM | SYSTOLIC BLOOD PRESSURE: 136 MMHG | BODY MASS INDEX: 21.43 KG/M2 | DIASTOLIC BLOOD PRESSURE: 81 MMHG

## 2025-05-28 DIAGNOSIS — C44.91 SKIN CANCER, BASAL CELL: Primary | ICD-10-CM

## 2025-05-28 PROCEDURE — 77336 RADIATION PHYSICS CONSULT: CPT | Performed by: RADIOLOGY

## 2025-05-28 PROCEDURE — 77412 RADIATION TX DELIVERY LVL 3: CPT | Performed by: RADIOLOGY

## 2025-05-28 NOTE — PROGRESS NOTES
Kylah Gomez  5/28/2025  Wt Readings from Last 3 Encounters:   05/28/25 58.4 kg (128 lb 12.8 oz)   05/21/25 57.9 kg (127 lb 9.6 oz)   04/30/25 56.8 kg (125 lb 3.2 oz)     Body mass index is 21.43 kg/m².      Treatment Area:right sup forehead    Patient was seen today for weekly visit.     Comfort Alteration  KPS:70%  Fatigue: Mild        Nutritional Alteration  Anorexia: No   Nausea: No   Vomiting: No    Skin Alteration   Sensation:none    Radiation Dermatitis:  yes    Mucous Membrane Alteration  Drainage: No  Drainage Odor: No    Emotional  Coping: effective      Injury, potential bleeding or infection: na      Other:na    Lab Results   Component Value Date    WBC 6.4 07/10/2023    HGB 12.6 07/10/2023    HCT 39.7 07/10/2023     07/10/2023         /81   Pulse 79   Temp 97.4 °F (36.3 °C) (Temporal)   Resp 18   Wt 58.4 kg (128 lb 12.8 oz)   SpO2 94%   BMI 21.43 kg/m²   BP within normal range? yes       Assessment/Plan:Completed 30/35 fractions; 6000/7000 cGy    Farhat Isabel RN

## 2025-05-29 ENCOUNTER — HOSPITAL ENCOUNTER (OUTPATIENT)
Dept: RADIATION ONCOLOGY | Age: 89
Discharge: HOME | End: 2025-05-29
Payer: COMMERCIAL

## 2025-05-29 PROCEDURE — 77412 RADIATION TX DELIVERY LVL 3: CPT | Performed by: RADIOLOGY

## 2025-05-30 ENCOUNTER — HOSPITAL ENCOUNTER (OUTPATIENT)
Dept: RADIATION ONCOLOGY | Age: 89
Discharge: HOME | End: 2025-05-30
Payer: COMMERCIAL

## 2025-05-30 PROCEDURE — 77412 RADIATION TX DELIVERY LVL 3: CPT | Performed by: RADIOLOGY

## 2025-06-02 ENCOUNTER — HOSPITAL ENCOUNTER (OUTPATIENT)
Dept: RADIATION ONCOLOGY | Age: 89
Discharge: HOME | End: 2025-06-02
Payer: COMMERCIAL

## 2025-06-02 PROCEDURE — 77412 RADIATION TX DELIVERY LVL 3: CPT | Performed by: RADIOLOGY

## 2025-06-03 ENCOUNTER — HOSPITAL ENCOUNTER (OUTPATIENT)
Dept: RADIATION ONCOLOGY | Age: 89
Discharge: HOME | End: 2025-06-03
Payer: COMMERCIAL

## 2025-06-03 PROCEDURE — 77412 RADIATION TX DELIVERY LVL 3: CPT | Performed by: RADIOLOGY

## 2025-06-04 ENCOUNTER — HOSPITAL ENCOUNTER (OUTPATIENT)
Dept: RADIATION ONCOLOGY | Age: 89
Discharge: HOME | End: 2025-06-04
Payer: COMMERCIAL

## 2025-06-04 VITALS
RESPIRATION RATE: 18 BRPM | DIASTOLIC BLOOD PRESSURE: 83 MMHG | BODY MASS INDEX: 20.87 KG/M2 | WEIGHT: 125.4 LBS | TEMPERATURE: 97 F | SYSTOLIC BLOOD PRESSURE: 148 MMHG | OXYGEN SATURATION: 97 % | HEART RATE: 93 BPM

## 2025-06-04 DIAGNOSIS — C44.91 SKIN CANCER, BASAL CELL: Primary | ICD-10-CM

## 2025-06-04 PROCEDURE — 77336 RADIATION PHYSICS CONSULT: CPT | Performed by: RADIOLOGY

## 2025-06-04 PROCEDURE — 77412 RADIATION TX DELIVERY LVL 3: CPT | Performed by: RADIOLOGY

## 2025-06-04 NOTE — PROGRESS NOTES
Kylah Gomez  6/4/2025  Wt Readings from Last 3 Encounters:   06/04/25 56.9 kg (125 lb 6.4 oz)   05/28/25 58.4 kg (128 lb 12.8 oz)   05/21/25 57.9 kg (127 lb 9.6 oz)     Body mass index is 20.87 kg/m².      Treatment Area:right forehead    Patient was seen today for weekly visit.     Comfort Alteration  KPS:80%  Fatigue: None        Nutritional Alteration  Anorexia: No   Nausea: No   Vomiting: No    Skin Alteration   Sensation:good and using lotion    Radiation Dermatitis:  na    Mucous Membrane Alteration  Drainage: No  Drainage Odor: No    Emotional  Coping: effective      Injury, potential bleeding or infection: na      Other:na    Lab Results   Component Value Date    WBC 6.4 07/10/2023    HGB 12.6 07/10/2023    HCT 39.7 07/10/2023     07/10/2023         BP (!) 148/83   Pulse 93   Temp 97 °F (36.1 °C) (Skin)   Resp 18   Wt 56.9 kg (125 lb 6.4 oz)   SpO2 97%   BMI 20.87 kg/m²   BP within normal range? yes     n      Assessment/Plan:completed and discharge instructions given to patient and her family    Donna Bonds RN

## 2025-06-04 NOTE — PROGRESS NOTES
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  The skin cancer has almost disappeared.  Mild erythema.  Patient has completed the treatment today.  She has tolerated well, follow-up in 4 weeks.      Past medical, surgical, social and family histories reviewed and updated as indicated.    PAIN: No    ALLERGIES:  Patient has no known allergies.         Current Outpatient Medications   Medication Sig Dispense Refill    rosuvastatin (CRESTOR) 10 MG tablet Take 0.5 tablets by mouth 2 times a week      lidocaine (AKTEN) 3.5 % GEL Place 20 drops into the left eye daily 1 mL 4    metoprolol succinate (TOPROL XL) 25 MG extended release tablet Take 1 tablet by mouth daily      losartan (COZAAR) 100 MG tablet Take 1 tablet by mouth daily      NIFEdipine (ADALAT CC) 60 MG extended release tablet Take 1 tablet by mouth daily      apixaban (ELIQUIS) 2.5 MG TABS tablet Take 1 tablet by mouth 2 times daily 60 tablet 0     No current facility-administered medications for this encounter.         OBJECTIVE:  Alert and fully ambulatory. Pleasant and conversant.      Physical Examination:General appearance - alert, well appearing, and in no distress and normal appearing weight:  Constitutional: A well developed, well nourished 89 y.o. female who is alert, oriented, cooperative and in no apparent distress.  HEENT:   Skin:  Warm and dry.  No obvious rashes.    Vitals:    06/04/25 1340   BP: (!) 148/83   Pulse: 93   Resp: 18   Temp: 97 °F (36.1 °C)   TempSrc: Skin   SpO2: 97%   Weight: 56.9 kg (125 lb 6.4 oz)       Wt Readings from Last 3 Encounters:   06/04/25 56.9 kg (125 lb 6.4 oz)   05/28/25 58.4 kg (128 lb 12.8 oz)   05/21/25 57.9 kg (127 lb 9.6 oz)

## 2025-06-04 NOTE — PROGRESS NOTES
Kylah Gomez  6/4/2025  7:45 AM          Current Outpatient Medications   Medication Sig Dispense Refill    rosuvastatin (CRESTOR) 10 MG tablet Take 0.5 tablets by mouth 2 times a week      lidocaine (AKTEN) 3.5 % GEL Place 20 drops into the left eye daily 1 mL 4    metoprolol succinate (TOPROL XL) 25 MG extended release tablet Take 1 tablet by mouth daily      losartan (COZAAR) 100 MG tablet Take 1 tablet by mouth daily      NIFEdipine (ADALAT CC) 60 MG extended release tablet Take 1 tablet by mouth daily      apixaban (ELIQUIS) 2.5 MG TABS tablet Take 1 tablet by mouth 2 times daily 60 tablet 0     No current facility-administered medications for this encounter.          This is an up-to-date medication list.    Please take this list to your next care provider, and discard any previous medication lists.

## 2025-07-11 ENCOUNTER — HOSPITAL ENCOUNTER (OUTPATIENT)
Dept: RADIATION ONCOLOGY | Age: 89
Discharge: HOME | End: 2025-07-11

## 2025-07-11 VITALS
BODY MASS INDEX: 20.57 KG/M2 | DIASTOLIC BLOOD PRESSURE: 79 MMHG | TEMPERATURE: 97.9 F | HEART RATE: 96 BPM | RESPIRATION RATE: 18 BRPM | WEIGHT: 123.6 LBS | SYSTOLIC BLOOD PRESSURE: 135 MMHG

## 2025-07-11 DIAGNOSIS — Z85.828 HX OF SKIN CANCER, BASAL CELL: Primary | ICD-10-CM

## 2025-07-11 NOTE — PROGRESS NOTES
DEPARTMENT OF RADIATION ONCOLOGY   Follow up visit        2025    NAME:  Kylah Gomez    :  1931 89 y.o. female     PCP: Ronn Dias DO    DIAGNOSIS:  1. 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  The patient is here for routine follow-up.  Complete response to the treatment on physical examination..    Past medical, surgical, social and family histories reviewed and updated as indicated.    ALLERGIES:  Patient has no known allergies.       MEDICATIONS:   Current Outpatient Medications:     rosuvastatin (CRESTOR) 10 MG tablet, Take 0.5 tablets by mouth 2 times a week, Disp: , Rfl:     lidocaine (AKTEN) 3.5 % GEL, Place 20 drops into the left eye daily, Disp: 1 mL, Rfl: 4    metoprolol succinate (TOPROL XL) 25 MG extended release tablet, Take 1 tablet by mouth daily, Disp: , Rfl:     losartan (COZAAR) 100 MG tablet, Take 1 tablet by mouth daily, Disp: , Rfl:     NIFEdipine (ADALAT CC) 60 MG extended release tablet, Take 1 tablet by mouth daily, Disp: , Rfl:     apixaban (ELIQUIS) 2.5 MG TABS tablet, Take 1 tablet by mouth 2 times daily, Disp: 60 tablet, Rfl: 0    REVIEW OF SYSTEMS: Obtained from the patient, chart review and nursing assessment. 10-point ROS reviewed and negative except as per above.    PHYSICAL EXAMINATION:    Vitals:    25 1107   BP: 135/79   Pulse: 96   Resp: 18   Temp: 97.9 °F (36.6 °C)   TempSrc: Temporal   Weight: 56.1 kg (123 lb 9.6 oz)       Wt Readings from Last 3 Encounters:   25 56.1 kg (123 lb 9.6 oz)   25 56.9 kg

## 2025-07-11 NOTE — PROGRESS NOTES
Kylah Lyonjeffrey  7/11/2025  11:14 AM      Vitals:    07/11/25 1107   BP: 135/79   Pulse: 96   Resp: 18   Temp: 97.9 °F (36.6 °C)    :    Wt Readings from Last 3 Encounters:   07/11/25 56.1 kg (123 lb 9.6 oz)   06/04/25 56.9 kg (125 lb 6.4 oz)   05/28/25 58.4 kg (128 lb 12.8 oz)                Current Outpatient Medications:     rosuvastatin (CRESTOR) 10 MG tablet, Take 0.5 tablets by mouth 2 times a week, Disp: , Rfl:     lidocaine (AKTEN) 3.5 % GEL, Place 20 drops into the left eye daily, Disp: 1 mL, Rfl: 4    metoprolol succinate (TOPROL XL) 25 MG extended release tablet, Take 1 tablet by mouth daily, Disp: , Rfl:     losartan (COZAAR) 100 MG tablet, Take 1 tablet by mouth daily, Disp: , Rfl:     NIFEdipine (ADALAT CC) 60 MG extended release tablet, Take 1 tablet by mouth daily, Disp: , Rfl:     apixaban (ELIQUIS) 2.5 MG TABS tablet, Take 1 tablet by mouth 2 times daily, Disp: 60 tablet, Rfl: 0      Patient is seen today in follow up for S/P RT to R sup forehead completed 35 fractions 6/4/2025.  She is here with her daughter, patient states she is doing well, denies pain, RT site looks good.  No other complaints, Dr Duarte updated.        FALLS RISK SCREENING ASSESSMENT    Instructions:  Assess the patient and enter the appropriate indicators that are present for fall risk identification.   Total the numbers entered and assign a fall risk score from Table 2.  Reassess patient at a minimum every 12 weeks or with status change.    Assessment   Date  7/11/2025     1.  Mental Ability: confusion/cognitively impaired No - 0       2.  Elimination Issues: incontinence, frequency No - 0       3.  Ambulatory: use of assistive devices (walker, cane, off-loading devices), attached to equipment (IV pole, oxygen) No - 0     4.  Sensory Limitations: dizziness, vertigo, impaired vision No - 0       5.  Age 65 years or greater - 1       6.  Medication: diuretics, strong analgesics, hypnotics, sedatives, antihypertensive

## 2025-08-06 ENCOUNTER — HOSPITAL ENCOUNTER (EMERGENCY)
Age: 89
Discharge: HOME | End: 2025-08-06
Attending: EMERGENCY MEDICINE
Payer: COMMERCIAL

## 2025-08-06 ENCOUNTER — APPOINTMENT (OUTPATIENT)
Dept: CT IMAGING | Age: 89
End: 2025-08-06
Payer: COMMERCIAL

## 2025-08-06 VITALS
HEART RATE: 87 BPM | RESPIRATION RATE: 16 BRPM | WEIGHT: 123 LBS | OXYGEN SATURATION: 98 % | DIASTOLIC BLOOD PRESSURE: 82 MMHG | TEMPERATURE: 97.8 F | BODY MASS INDEX: 21.79 KG/M2 | SYSTOLIC BLOOD PRESSURE: 134 MMHG | HEIGHT: 63 IN

## 2025-08-06 DIAGNOSIS — S02.2XXA CLOSED FRACTURE OF NASAL BONE, INITIAL ENCOUNTER: Primary | ICD-10-CM

## 2025-08-06 DIAGNOSIS — S02.2XXA CLOSED FRACTURE OF NASAL SEPTUM, INITIAL ENCOUNTER: ICD-10-CM

## 2025-08-06 PROCEDURE — 2500000003 HC RX 250 WO HCPCS

## 2025-08-06 PROCEDURE — 70450 CT HEAD/BRAIN W/O DYE: CPT

## 2025-08-06 PROCEDURE — 6360000002 HC RX W HCPCS: Performed by: EMERGENCY MEDICINE

## 2025-08-06 PROCEDURE — 72125 CT NECK SPINE W/O DYE: CPT

## 2025-08-06 PROCEDURE — 99284 EMERGENCY DEPT VISIT MOD MDM: CPT

## 2025-08-06 PROCEDURE — 90714 TD VACC NO PRESV 7 YRS+ IM: CPT | Performed by: EMERGENCY MEDICINE

## 2025-08-06 PROCEDURE — 70486 CT MAXILLOFACIAL W/O DYE: CPT

## 2025-08-06 PROCEDURE — 6370000000 HC RX 637 (ALT 250 FOR IP): Performed by: EMERGENCY MEDICINE

## 2025-08-06 PROCEDURE — 90471 IMMUNIZATION ADMIN: CPT | Performed by: EMERGENCY MEDICINE

## 2025-08-06 RX ORDER — OXYMETAZOLINE HYDROCHLORIDE 0.05 G/100ML
2 SPRAY NASAL 2 TIMES DAILY
Qty: 2 ML | Refills: 0 | Status: SHIPPED | OUTPATIENT
Start: 2025-08-06 | End: 2025-08-09

## 2025-08-06 RX ORDER — ECHINACEA PURPUREA EXTRACT 125 MG
1 TABLET ORAL PRN
Qty: 1 EACH | Refills: 3 | Status: SHIPPED | OUTPATIENT
Start: 2025-08-06

## 2025-08-06 RX ORDER — ACETAMINOPHEN 500 MG
1000 TABLET ORAL ONCE
Status: COMPLETED | OUTPATIENT
Start: 2025-08-06 | End: 2025-08-06

## 2025-08-06 RX ORDER — TRANEXAMIC ACID 100 MG/ML
INJECTION, SOLUTION INTRAVENOUS
Status: COMPLETED
Start: 2025-08-06 | End: 2025-08-06

## 2025-08-06 RX ADMIN — ACETAMINOPHEN 1000 MG: 500 TABLET ORAL at 15:28

## 2025-08-06 RX ADMIN — TRANEXAMIC ACID: 100 INJECTION, SOLUTION INTRAVENOUS at 14:39

## 2025-08-06 RX ADMIN — CLOSTRIDIUM TETANI TOXOID ANTIGEN (FORMALDEHYDE INACTIVATED) AND CORYNEBACTERIUM DIPHTHERIAE TOXOID ANTIGEN (FORMALDEHYDE INACTIVATED) 0.5 ML: 5; 2 INJECTION, SUSPENSION INTRAMUSCULAR at 14:43

## 2025-08-06 ASSESSMENT — LIFESTYLE VARIABLES
HOW OFTEN DO YOU HAVE A DRINK CONTAINING ALCOHOL: NEVER
HOW MANY STANDARD DRINKS CONTAINING ALCOHOL DO YOU HAVE ON A TYPICAL DAY: PATIENT DOES NOT DRINK

## 2025-08-06 ASSESSMENT — PAIN SCALES - GENERAL: PAINLEVEL_OUTOF10: 3

## 2025-08-06 ASSESSMENT — ENCOUNTER SYMPTOMS: EYE REDNESS: 0

## 2025-08-08 ENCOUNTER — OFFICE VISIT (OUTPATIENT)
Dept: ENT CLINIC | Age: 89
End: 2025-08-08
Payer: COMMERCIAL

## 2025-08-08 VITALS
WEIGHT: 121 LBS | OXYGEN SATURATION: 97 % | TEMPERATURE: 97.8 F | BODY MASS INDEX: 20.66 KG/M2 | RESPIRATION RATE: 12 BRPM | SYSTOLIC BLOOD PRESSURE: 110 MMHG | DIASTOLIC BLOOD PRESSURE: 70 MMHG | HEIGHT: 64 IN | HEART RATE: 63 BPM

## 2025-08-08 DIAGNOSIS — R04.0 EPISTAXIS: ICD-10-CM

## 2025-08-08 DIAGNOSIS — S02.2XXD CLOSED FRACTURE OF NASAL BONE WITH ROUTINE HEALING, SUBSEQUENT ENCOUNTER: Primary | ICD-10-CM

## 2025-08-08 PROCEDURE — 99203 OFFICE O/P NEW LOW 30 MIN: CPT | Performed by: OTOLARYNGOLOGY

## 2025-08-08 PROCEDURE — 1159F MED LIST DOCD IN RCRD: CPT | Performed by: OTOLARYNGOLOGY

## 2025-08-08 PROCEDURE — 1036F TOBACCO NON-USER: CPT | Performed by: OTOLARYNGOLOGY

## 2025-08-08 PROCEDURE — 1123F ACP DISCUSS/DSCN MKR DOCD: CPT | Performed by: OTOLARYNGOLOGY

## 2025-08-08 PROCEDURE — G8420 CALC BMI NORM PARAMETERS: HCPCS | Performed by: OTOLARYNGOLOGY

## 2025-08-08 PROCEDURE — G8427 DOCREV CUR MEDS BY ELIG CLIN: HCPCS | Performed by: OTOLARYNGOLOGY

## 2025-08-08 PROCEDURE — 1090F PRES/ABSN URINE INCON ASSESS: CPT | Performed by: OTOLARYNGOLOGY

## 2025-08-08 PROCEDURE — 1160F RVW MEDS BY RX/DR IN RCRD: CPT | Performed by: OTOLARYNGOLOGY

## 2025-08-22 ENCOUNTER — OFFICE VISIT (OUTPATIENT)
Dept: ENT CLINIC | Age: 89
End: 2025-08-22
Payer: COMMERCIAL

## 2025-08-22 VITALS
HEIGHT: 64 IN | SYSTOLIC BLOOD PRESSURE: 114 MMHG | OXYGEN SATURATION: 97 % | BODY MASS INDEX: 21 KG/M2 | HEART RATE: 91 BPM | RESPIRATION RATE: 12 BRPM | WEIGHT: 123 LBS | DIASTOLIC BLOOD PRESSURE: 60 MMHG | TEMPERATURE: 97.9 F

## 2025-08-22 DIAGNOSIS — S02.2XXD CLOSED FRACTURE OF NASAL BONE WITH ROUTINE HEALING, SUBSEQUENT ENCOUNTER: ICD-10-CM

## 2025-08-22 DIAGNOSIS — R04.0 EPISTAXIS: Primary | ICD-10-CM

## 2025-08-22 PROCEDURE — 1160F RVW MEDS BY RX/DR IN RCRD: CPT | Performed by: OTOLARYNGOLOGY

## 2025-08-22 PROCEDURE — 1090F PRES/ABSN URINE INCON ASSESS: CPT | Performed by: OTOLARYNGOLOGY

## 2025-08-22 PROCEDURE — 1123F ACP DISCUSS/DSCN MKR DOCD: CPT | Performed by: OTOLARYNGOLOGY

## 2025-08-22 PROCEDURE — G8420 CALC BMI NORM PARAMETERS: HCPCS | Performed by: OTOLARYNGOLOGY

## 2025-08-22 PROCEDURE — G8427 DOCREV CUR MEDS BY ELIG CLIN: HCPCS | Performed by: OTOLARYNGOLOGY

## 2025-08-22 PROCEDURE — 99213 OFFICE O/P EST LOW 20 MIN: CPT | Performed by: OTOLARYNGOLOGY

## 2025-08-22 PROCEDURE — 1036F TOBACCO NON-USER: CPT | Performed by: OTOLARYNGOLOGY

## 2025-08-22 PROCEDURE — 1159F MED LIST DOCD IN RCRD: CPT | Performed by: OTOLARYNGOLOGY
